# Patient Record
Sex: FEMALE | Race: WHITE | NOT HISPANIC OR LATINO
[De-identification: names, ages, dates, MRNs, and addresses within clinical notes are randomized per-mention and may not be internally consistent; named-entity substitution may affect disease eponyms.]

---

## 2023-03-30 PROBLEM — Z00.00 ENCOUNTER FOR PREVENTIVE HEALTH EXAMINATION: Status: ACTIVE | Noted: 2023-03-30

## 2023-04-11 ENCOUNTER — APPOINTMENT (OUTPATIENT)
Dept: ORTHOPEDIC SURGERY | Facility: CLINIC | Age: 83
End: 2023-04-11
Payer: MEDICARE

## 2023-04-11 VITALS — HEIGHT: 60 IN | WEIGHT: 197 LBS | BODY MASS INDEX: 38.68 KG/M2

## 2023-04-11 PROCEDURE — 99203 OFFICE O/P NEW LOW 30 MIN: CPT

## 2023-04-11 PROCEDURE — 72100 X-RAY EXAM L-S SPINE 2/3 VWS: CPT

## 2023-04-11 PROCEDURE — 73560 X-RAY EXAM OF KNEE 1 OR 2: CPT | Mod: 50

## 2023-04-11 NOTE — ASSESSMENT
[FreeTextEntry1] :  right now emphasis on weight management without any effort at weight control on concern is not much we can offer long-term will going to get gel injection for the knees will try some physical therapy could ultimately use pain management for the spine off for geniculate nerve blocks I will see her a couple months

## 2023-04-11 NOTE — IMAGING
[de-identified] :  markedly overweight\par  spasm in the back no focal neurologic deficits \par \par Hip motion good \par  both knees swelling crepitus limits in flexion does have a cane\par \par Calf soft negative Homans sign \par \par Lumbar x-rays marked arthritic change some scoliosis evidence of arterial stents\par \par X-rays both knees moderate arthritic change

## 2023-05-16 ENCOUNTER — APPOINTMENT (OUTPATIENT)
Dept: ORTHOPEDIC SURGERY | Facility: CLINIC | Age: 83
End: 2023-05-16
Payer: MEDICARE

## 2023-05-16 PROCEDURE — 99213 OFFICE O/P EST LOW 20 MIN: CPT | Mod: 25

## 2023-05-16 PROCEDURE — 20610 DRAIN/INJ JOINT/BURSA W/O US: CPT | Mod: 50

## 2023-05-16 NOTE — IMAGING
[de-identified] :  markedly overweight\par  spasm in the back no focal neurologic deficits \par \par Hip motion good \par  both knees swelling crepitus limits in flexion does have a cane\par \par Calf soft negative Homans sign \par \par Lumbar x-rays marked arthritic change some scoliosis evidence of arterial stents\par \par X-rays both knees moderate arthritic change

## 2023-05-16 NOTE — ASSESSMENT
[FreeTextEntry1] :  right now emphasis on weight management without any effort at weight control on concern is not much we can offer long-term will going to do gel injection for the knees toady the I  discussed with patient today the  option of a  Visco supplement injection to the knee.  risks and benefits were  reviewed a  consent was  given,  with sterile technique through a lateral approach the gel injections were  delivered and  tolerated well. a Band-Aids were applied\par  will try some physical therapy could ultimately use pain management for the spine  for geniculate nerve blocks I will see her 3 months

## 2023-05-16 NOTE — REASON FOR VISIT
[Spouse] : spouse [Family Member] : family member [FreeTextEntry2] : bilateral knee synvisc one  The started therapy 3 visits still back pain using a cane

## 2023-05-16 NOTE — HISTORY OF PRESENT ILLNESS
[de-identified] : [age] years of age here with family for her low back and knees several years of problems predating pain and de make reports having shots at sometime swelling in the knees swelling in the ankles had some diagnostics put not recent pain is 10/10 sitting makes it worse no therapy allergy to penicillin does not smoke medication for blood pressure gout and vitamins

## 2023-08-24 ENCOUNTER — APPOINTMENT (OUTPATIENT)
Dept: ORTHOPEDIC SURGERY | Facility: CLINIC | Age: 83
End: 2023-08-24
Payer: MEDICARE

## 2023-08-24 PROCEDURE — 99213 OFFICE O/P EST LOW 20 MIN: CPT

## 2023-08-24 NOTE — HISTORY OF PRESENT ILLNESS
[de-identified] : [age] years of age here with family for her low back and knees several years of problems predating pain and de make reports having shots at sometime swelling in the knees swelling in the ankles had some diagnostics put not recent pain is 10/10 sitting makes it worse no therapy allergy to penicillin does not smoke medication for blood pressure gout and vitamins

## 2023-08-24 NOTE — REASON FOR VISIT
[Spouse] : spouse [FreeTextEntry2] : lower back and bilat knee pain gel to both knees was helpful she did a few visits to therapy then got sick really was not thrilled with the place did lose 4 pounds pain is still traveling into both legs from the back using the cane

## 2023-08-24 NOTE — IMAGING
[de-identified] :  markedly overweight\par   spasm in the back no focal neurologic deficits \par  \par  Hip motion good \par   both knees swelling crepitus limits in flexion does have a cane\par  \par  Calf soft negative Homans sign \par  \par  Lumbar x-rays marked arthritic change some scoliosis evidence of arterial stents\par  \par  X-rays both knees moderate arthritic change

## 2023-08-24 NOTE — ASSESSMENT
[FreeTextEntry1] :  right now emphasis on weight management without any effort at weight control my concern is not much we can offer long-term will did gel injection for the knees which helped she is agreeable to try a different physical therapy location and continue to work with her weight reduction I will see her back in November we might consider repeating the gel injections to the knees at that time I will see her 3 months

## 2023-11-02 ENCOUNTER — APPOINTMENT (OUTPATIENT)
Dept: ORTHOPEDIC SURGERY | Facility: CLINIC | Age: 83
End: 2023-11-02
Payer: MEDICARE

## 2023-11-02 PROCEDURE — 99213 OFFICE O/P EST LOW 20 MIN: CPT

## 2023-12-11 ENCOUNTER — APPOINTMENT (OUTPATIENT)
Dept: ORTHOPEDIC SURGERY | Facility: CLINIC | Age: 83
End: 2023-12-11
Payer: MEDICARE

## 2023-12-11 PROCEDURE — 20610 DRAIN/INJ JOINT/BURSA W/O US: CPT | Mod: 50

## 2023-12-11 PROCEDURE — 99213 OFFICE O/P EST LOW 20 MIN: CPT | Mod: 25

## 2024-03-11 ENCOUNTER — APPOINTMENT (OUTPATIENT)
Dept: ORTHOPEDIC SURGERY | Facility: CLINIC | Age: 84
End: 2024-03-11
Payer: MEDICARE

## 2024-03-11 PROCEDURE — 99213 OFFICE O/P EST LOW 20 MIN: CPT

## 2024-03-11 NOTE — IMAGING
[de-identified] :  markedly overweight\par   spasm in the back no focal neurologic deficits \par  \par  Hip motion good \par   both knees swelling crepitus limits in flexion does have a cane\par  \par  Calf soft negative Homans sign \par  \par  Lumbar x-rays marked arthritic change some scoliosis evidence of arterial stents\par  \par  X-rays both knees moderate arthritic change

## 2024-03-11 NOTE — REASON FOR VISIT
[Spouse] : spouse [FreeTextEntry2] : Patient is coming in for a follow up on bilateral knees.  Still using a cane gel helped a little bit no help with the back can only stand 20 minutes lost only 3 pounds does have a lot of difficulty with bladder control

## 2024-03-11 NOTE — ASSESSMENT
[FreeTextEntry1] : Gel was helpful certainly could repeated but I am fearful that without any substantive weight reduction the back pain is only going to get worse she is seeing her internist I gave her a note to inquire whether or not she could try Ozempic return in 3 months repeat the gel at that time

## 2024-03-11 NOTE — HISTORY OF PRESENT ILLNESS
[de-identified] : [age] years of age here with family for her low back and knees several years of problems predating pain and de make reports having shots at sometime swelling in the knees swelling in the ankles had some diagnostics put not recent pain is 10/10 sitting makes it worse no therapy allergy to penicillin does not smoke medication for blood pressure gout and vitamins

## 2024-06-11 ENCOUNTER — APPOINTMENT (OUTPATIENT)
Dept: ORTHOPEDIC SURGERY | Facility: CLINIC | Age: 84
End: 2024-06-11

## 2024-06-11 DIAGNOSIS — M17.11 UNILATERAL PRIMARY OSTEOARTHRITIS, RIGHT KNEE: ICD-10-CM

## 2024-06-11 DIAGNOSIS — M51.9 UNSPECIFIED THORACIC, THORACOLUMBAR AND LUMBOSACRAL INTERVERTEBRAL DISC DISORDER: ICD-10-CM

## 2024-06-11 DIAGNOSIS — M17.12 UNILATERAL PRIMARY OSTEOARTHRITIS, LEFT KNEE: ICD-10-CM

## 2024-06-11 PROCEDURE — ZZZZZ: CPT

## 2024-06-11 NOTE — REASON FOR VISIT
[Spouse] : spouse [FreeTextEntry2] : Patient is coming in for a follow up on bilateral knee injections synvisc one was on Ozempic for few weeks and had a reaction did lose 10 pounds stopped it right now

## 2024-06-11 NOTE — IMAGING
[de-identified] :  markedly overweight\par   spasm in the back no focal neurologic deficits \par  \par  Hip motion good \par   both knees swelling crepitus limits in flexion does have a cane\par  \par  Calf soft negative Homans sign \par  \par  Lumbar x-rays marked arthritic change some scoliosis evidence of arterial stents\par  \par  X-rays both knees moderate arthritic change

## 2024-06-11 NOTE — HISTORY OF PRESENT ILLNESS
[de-identified] : [age] years of age here with family for her low back and knees several years of problems predating pain and de make reports having shots at sometime swelling in the knees swelling in the ankles had some diagnostics put not recent pain is 10/10 sitting makes it worse no therapy allergy to penicillin does not smoke medication for blood pressure gout and vitamins
bleeding/pregnancy problem

## 2024-06-11 NOTE — ASSESSMENT
[FreeTextEntry1] : Gel was helpful certainly could repeated but I am fearful that without any substantive weight reduction the back pain is only going to get worse  I  discussed with patient today the  option of a  Visco supplement injection to both knees.  risks and benefits were  reviewed a  consent was  given,  with sterile technique through a lateral approach the gel injection was delivered and  tolerated well. a Band-Aid was applied

## 2024-07-23 ENCOUNTER — INPATIENT (INPATIENT)
Facility: HOSPITAL | Age: 84
LOS: 5 days | Discharge: HOME HEALTH | DRG: 65 | End: 2024-07-29
Attending: STUDENT IN AN ORGANIZED HEALTH CARE EDUCATION/TRAINING PROGRAM | Admitting: STUDENT IN AN ORGANIZED HEALTH CARE EDUCATION/TRAINING PROGRAM
Payer: MEDICARE

## 2024-07-23 VITALS
TEMPERATURE: 99 F | WEIGHT: 190.04 LBS | OXYGEN SATURATION: 95 % | SYSTOLIC BLOOD PRESSURE: 153 MMHG | DIASTOLIC BLOOD PRESSURE: 79 MMHG | RESPIRATION RATE: 18 BRPM | HEIGHT: 64 IN | HEART RATE: 64 BPM

## 2024-07-23 DIAGNOSIS — I63.9 CEREBRAL INFARCTION, UNSPECIFIED: ICD-10-CM

## 2024-07-23 LAB
ALBUMIN SERPL ELPH-MCNC: 3.9 G/DL — SIGNIFICANT CHANGE UP (ref 3.5–5.2)
ALP SERPL-CCNC: 96 U/L — SIGNIFICANT CHANGE UP (ref 30–115)
ALT FLD-CCNC: 13 U/L — SIGNIFICANT CHANGE UP (ref 0–41)
ANION GAP SERPL CALC-SCNC: 14 MMOL/L — SIGNIFICANT CHANGE UP (ref 7–14)
AST SERPL-CCNC: 35 U/L — SIGNIFICANT CHANGE UP (ref 0–41)
BASOPHILS # BLD AUTO: 0.03 K/UL — SIGNIFICANT CHANGE UP (ref 0–0.2)
BASOPHILS NFR BLD AUTO: 0.4 % — SIGNIFICANT CHANGE UP (ref 0–1)
BILIRUB SERPL-MCNC: 1 MG/DL — SIGNIFICANT CHANGE UP (ref 0.2–1.2)
BUN SERPL-MCNC: 15 MG/DL — SIGNIFICANT CHANGE UP (ref 10–20)
CALCIUM SERPL-MCNC: 10.7 MG/DL — HIGH (ref 8.4–10.5)
CHLORIDE SERPL-SCNC: 110 MMOL/L — SIGNIFICANT CHANGE UP (ref 98–110)
CO2 SERPL-SCNC: 17 MMOL/L — SIGNIFICANT CHANGE UP (ref 17–32)
CREAT SERPL-MCNC: 0.8 MG/DL — SIGNIFICANT CHANGE UP (ref 0.7–1.5)
EGFR: 73 ML/MIN/1.73M2 — SIGNIFICANT CHANGE UP
EOSINOPHIL # BLD AUTO: 0.07 K/UL — SIGNIFICANT CHANGE UP (ref 0–0.7)
EOSINOPHIL NFR BLD AUTO: 1 % — SIGNIFICANT CHANGE UP (ref 0–8)
GLUCOSE SERPL-MCNC: 144 MG/DL — HIGH (ref 70–99)
HCT VFR BLD CALC: 38.2 % — SIGNIFICANT CHANGE UP (ref 37–47)
HGB BLD-MCNC: 13 G/DL — SIGNIFICANT CHANGE UP (ref 12–16)
IMM GRANULOCYTES NFR BLD AUTO: 0.7 % — HIGH (ref 0.1–0.3)
LYMPHOCYTES # BLD AUTO: 1.62 K/UL — SIGNIFICANT CHANGE UP (ref 1.2–3.4)
LYMPHOCYTES # BLD AUTO: 22.9 % — SIGNIFICANT CHANGE UP (ref 20.5–51.1)
MAGNESIUM SERPL-MCNC: 1.6 MG/DL — LOW (ref 1.8–2.4)
MCHC RBC-ENTMCNC: 32.2 PG — HIGH (ref 27–31)
MCHC RBC-ENTMCNC: 34 G/DL — SIGNIFICANT CHANGE UP (ref 32–37)
MCV RBC AUTO: 94.6 FL — SIGNIFICANT CHANGE UP (ref 81–99)
MONOCYTES # BLD AUTO: 0.54 K/UL — SIGNIFICANT CHANGE UP (ref 0.1–0.6)
MONOCYTES NFR BLD AUTO: 7.6 % — SIGNIFICANT CHANGE UP (ref 1.7–9.3)
NEUTROPHILS # BLD AUTO: 4.76 K/UL — SIGNIFICANT CHANGE UP (ref 1.4–6.5)
NEUTROPHILS NFR BLD AUTO: 67.4 % — SIGNIFICANT CHANGE UP (ref 42.2–75.2)
NRBC # BLD: 0 /100 WBCS — SIGNIFICANT CHANGE UP (ref 0–0)
PLATELET # BLD AUTO: 201 K/UL — SIGNIFICANT CHANGE UP (ref 130–400)
PMV BLD: 9.8 FL — SIGNIFICANT CHANGE UP (ref 7.4–10.4)
POTASSIUM SERPL-MCNC: 4.3 MMOL/L — SIGNIFICANT CHANGE UP (ref 3.5–5)
POTASSIUM SERPL-SCNC: 4.3 MMOL/L — SIGNIFICANT CHANGE UP (ref 3.5–5)
PROT SERPL-MCNC: 6.2 G/DL — SIGNIFICANT CHANGE UP (ref 6–8)
RBC # BLD: 4.04 M/UL — LOW (ref 4.2–5.4)
RBC # FLD: 13.1 % — SIGNIFICANT CHANGE UP (ref 11.5–14.5)
SODIUM SERPL-SCNC: 141 MMOL/L — SIGNIFICANT CHANGE UP (ref 135–146)
WBC # BLD: 7.07 K/UL — SIGNIFICANT CHANGE UP (ref 4.8–10.8)
WBC # FLD AUTO: 7.07 K/UL — SIGNIFICANT CHANGE UP (ref 4.8–10.8)

## 2024-07-23 PROCEDURE — 97110 THERAPEUTIC EXERCISES: CPT | Mod: GP

## 2024-07-23 PROCEDURE — 97535 SELF CARE MNGMENT TRAINING: CPT | Mod: GO

## 2024-07-23 PROCEDURE — 93010 ELECTROCARDIOGRAM REPORT: CPT

## 2024-07-23 PROCEDURE — 80076 HEPATIC FUNCTION PANEL: CPT

## 2024-07-23 PROCEDURE — 83036 HEMOGLOBIN GLYCOSYLATED A1C: CPT

## 2024-07-23 PROCEDURE — 85025 COMPLETE CBC W/AUTO DIFF WBC: CPT

## 2024-07-23 PROCEDURE — 80053 COMPREHEN METABOLIC PANEL: CPT

## 2024-07-23 PROCEDURE — 36415 COLL VENOUS BLD VENIPUNCTURE: CPT

## 2024-07-23 PROCEDURE — 86140 C-REACTIVE PROTEIN: CPT

## 2024-07-23 PROCEDURE — 97166 OT EVAL MOD COMPLEX 45 MIN: CPT | Mod: GO

## 2024-07-23 PROCEDURE — 93005 ELECTROCARDIOGRAM TRACING: CPT

## 2024-07-23 PROCEDURE — 97530 THERAPEUTIC ACTIVITIES: CPT | Mod: GO

## 2024-07-23 PROCEDURE — 80061 LIPID PANEL: CPT

## 2024-07-23 PROCEDURE — 99285 EMERGENCY DEPT VISIT HI MDM: CPT

## 2024-07-23 PROCEDURE — 93306 TTE W/DOPPLER COMPLETE: CPT

## 2024-07-23 PROCEDURE — 92507 TX SP LANG VOICE COMM INDIV: CPT | Mod: GN

## 2024-07-23 PROCEDURE — 80048 BASIC METABOLIC PNL TOTAL CA: CPT

## 2024-07-23 PROCEDURE — 71045 X-RAY EXAM CHEST 1 VIEW: CPT

## 2024-07-23 PROCEDURE — 99222 1ST HOSP IP/OBS MODERATE 55: CPT

## 2024-07-23 PROCEDURE — 84443 ASSAY THYROID STIM HORMONE: CPT

## 2024-07-23 PROCEDURE — 92610 EVALUATE SWALLOWING FUNCTION: CPT | Mod: GN

## 2024-07-23 PROCEDURE — 82962 GLUCOSE BLOOD TEST: CPT

## 2024-07-23 PROCEDURE — 70498 CT ANGIOGRAPHY NECK: CPT | Mod: 26,MC

## 2024-07-23 PROCEDURE — 97116 GAIT TRAINING THERAPY: CPT | Mod: GP

## 2024-07-23 PROCEDURE — 92523 SPEECH SOUND LANG COMPREHEN: CPT | Mod: GN

## 2024-07-23 PROCEDURE — 97162 PT EVAL MOD COMPLEX 30 MIN: CPT | Mod: GP

## 2024-07-23 PROCEDURE — 70496 CT ANGIOGRAPHY HEAD: CPT | Mod: 26,MC

## 2024-07-23 PROCEDURE — 85652 RBC SED RATE AUTOMATED: CPT

## 2024-07-23 PROCEDURE — 83735 ASSAY OF MAGNESIUM: CPT

## 2024-07-23 PROCEDURE — 84100 ASSAY OF PHOSPHORUS: CPT

## 2024-07-23 RX ORDER — PANTOPRAZOLE SODIUM 20 MG/1
40 TABLET, DELAYED RELEASE ORAL
Refills: 0 | Status: DISCONTINUED | OUTPATIENT
Start: 2024-07-23 | End: 2024-07-29

## 2024-07-23 RX ORDER — PANTOPRAZOLE SODIUM 20 MG/1
1 TABLET, DELAYED RELEASE ORAL
Refills: 0 | DISCHARGE

## 2024-07-23 RX ORDER — ENOXAPARIN SODIUM 120 MG/.8ML
40 INJECTION SUBCUTANEOUS EVERY 24 HOURS
Refills: 0 | Status: DISCONTINUED | OUTPATIENT
Start: 2024-07-23 | End: 2024-07-29

## 2024-07-23 RX ORDER — METOPROLOL TARTRATE 100 MG
1 TABLET ORAL
Refills: 0 | DISCHARGE

## 2024-07-23 RX ORDER — CLOPIDOGREL BISULFATE 75 MG/1
75 TABLET, FILM COATED ORAL DAILY
Refills: 0 | Status: DISCONTINUED | OUTPATIENT
Start: 2024-07-24 | End: 2024-07-29

## 2024-07-23 RX ORDER — ASPIRIN 500 MG
81 TABLET ORAL DAILY
Refills: 0 | Status: DISCONTINUED | OUTPATIENT
Start: 2024-07-23 | End: 2024-07-29

## 2024-07-23 RX ORDER — BACTERIOSTATIC SODIUM CHLORIDE 0.9 %
1000 VIAL (ML) INJECTION ONCE
Refills: 0 | Status: COMPLETED | OUTPATIENT
Start: 2024-07-23 | End: 2024-07-23

## 2024-07-23 RX ORDER — OXYBUTYNIN CHLORIDE 5 MG/1
5 TABLET, FILM COATED, EXTENDED RELEASE ORAL
Refills: 0 | Status: DISCONTINUED | OUTPATIENT
Start: 2024-07-23 | End: 2024-07-29

## 2024-07-23 RX ORDER — LOSARTAN POTASSIUM 50 MG/1
100 TABLET, FILM COATED ORAL DAILY
Refills: 0 | Status: DISCONTINUED | OUTPATIENT
Start: 2024-07-23 | End: 2024-07-29

## 2024-07-23 RX ORDER — TOLTERODINE TARTRATE 2 MG/1
1 CAPSULE, EXTENDED RELEASE ORAL
Refills: 0 | DISCHARGE

## 2024-07-23 RX ORDER — ASPIRIN 500 MG
1 TABLET ORAL
Refills: 0 | DISCHARGE

## 2024-07-23 RX ORDER — CLOPIDOGREL BISULFATE 75 MG/1
TABLET, FILM COATED ORAL
Refills: 0 | Status: DISCONTINUED | OUTPATIENT
Start: 2024-07-23 | End: 2024-07-29

## 2024-07-23 RX ORDER — METOPROLOL TARTRATE 100 MG
100 TABLET ORAL
Refills: 0 | Status: DISCONTINUED | OUTPATIENT
Start: 2024-07-23 | End: 2024-07-29

## 2024-07-23 RX ORDER — MAGNESIUM SULFATE 500 MG/ML
2 VIAL (ML) INJECTION ONCE
Refills: 0 | Status: COMPLETED | OUTPATIENT
Start: 2024-07-23 | End: 2024-07-23

## 2024-07-23 RX ORDER — ATORVASTATIN CALCIUM 40 MG/1
80 TABLET, FILM COATED ORAL AT BEDTIME
Refills: 0 | Status: DISCONTINUED | OUTPATIENT
Start: 2024-07-23 | End: 2024-07-29

## 2024-07-23 RX ORDER — MIRABEGRON 50 MG/1
1 TABLET, FILM COATED, EXTENDED RELEASE ORAL
Refills: 0 | DISCHARGE

## 2024-07-23 RX ORDER — IRBESARTAN 150 MG/1
1 TABLET ORAL
Refills: 0 | DISCHARGE

## 2024-07-23 RX ORDER — CLOPIDOGREL BISULFATE 75 MG/1
300 TABLET, FILM COATED ORAL ONCE
Refills: 0 | Status: COMPLETED | OUTPATIENT
Start: 2024-07-23 | End: 2024-07-23

## 2024-07-23 RX ADMIN — Medication 25 GRAM(S): at 23:53

## 2024-07-23 RX ADMIN — Medication 81 MILLIGRAM(S): at 23:52

## 2024-07-23 RX ADMIN — CLOPIDOGREL BISULFATE 300 MILLIGRAM(S): 75 TABLET, FILM COATED ORAL at 23:51

## 2024-07-23 RX ADMIN — Medication 83.33 MILLILITER(S): at 23:53

## 2024-07-23 NOTE — ED PROVIDER NOTE - ATTENDING APP SHARED VISIT CONTRIBUTION OF CARE
82-year-old presented today for evaluation of stroke.  Patient had MRI performed which is indicative of stroke.  Patient is pending neurological evaluation for further disposition .

## 2024-07-23 NOTE — H&P ADULT - HISTORY OF PRESENT ILLNESS
82-year-old right handed, no substance use, living with , retired; with PMHx of HTN, HLD, urinary incontinence, GERD; presented today for evaluation of stroke.  Patient had MRI performed which is indicative of stroke. Patient presented 4 days ago in the morning with dysarthria and difficulty swallow that wouldn't resolve. Initially she thought it was a cold, but finally today she visited her PCP Dr. Dominguez, who ordered the workup and referred her to ED for evaluation. Patient will be admitted to neurology for further workup and management    In ED:    T(C): 37.2 (23 Jul 2024 19:41), Max: 37.2 (23 Jul 2024 19:41)  T(F): 98.9 (23 Jul 2024 19:41), Max: 98.9 (23 Jul 2024 19:41)  HR: 64 (23 Jul 2024 19:41) (64 - 64)  BP: 153/79 (23 Jul 2024 19:41) (153/79 - 153/79)  RR: 18 (23 Jul 2024 19:41) (18 - 18)  SpO2: 95% (23 Jul 2024 19:41) (95% - 95%)  Patient On (Oxygen Delivery Method): room air    Labs: HCO3 17 . Ca 10.7. Mg 1.6    CT HEAD: No evidence of acute intracranial hemorrhage, mass, or midline shift. Chronic microvascular changes.    CTA: Negative CTA of the head and neck. No evidence of major vascular stenosis, occlusion, or aneurysm.

## 2024-07-23 NOTE — H&P ADULT - NSHPPHYSICALEXAM_GEN_ALL_CORE
GEN: NAD, pleasant, cooperative  CVS: RRR, no carotid bruit  CHEST: No signs of  distress, on room air  ABD: Soft, NTTP  NEURO:     MENTAL STATUS: AAOx3    LANG/SPEECH: Fluent, intact naming, repetition & comprehension. Mild-moderate dysarthria    CRANIAL NERVES:     - II: Pupils equal and reactive, no RAPD, normal visual field      - III, IV, VI: EOM intact, no gaze preference or deviation     - V: normal     - VII: no facial asymmetry     - VIII: normal hearing to speech    MOTOR: 5/5 in both upper and lower extremities    REFLEXES: 2/4 throughout, bilateral flexor planters    SENSORY: Normal to touch, temperature & pin prick in all extremiteis    COORD: Normal finger to nose and heel to shin, no tremor, no dysmetria    NIHSS = 1

## 2024-07-23 NOTE — H&P ADULT - NSHPLABSRESULTS_GEN_ALL_CORE
LABS:                         13.0   7.07  )-----------( 201      ( 23 Jul 2024 20:58 )             38.2     07-23    141  |  110  |  15  ----------------------------<  144<H>  4.3   |  17  |  0.8    Ca    10.7<H>      23 Jul 2024 20:58  Mg     1.6     07-23    TPro  6.2  /  Alb  3.9  /  TBili  1.0  /  DBili  x   /  AST  35  /  ALT  13  /  AlkPhos  96  07-23      Urinalysis Basic - ( 23 Jul 2024 20:58 )    Color: x / Appearance: x / SG: x / pH: x  Gluc: 144 mg/dL / Ketone: x  / Bili: x / Urobili: x   Blood: x / Protein: x / Nitrite: x   Leuk Esterase: x / RBC: x / WBC x   Sq Epi: x / Non Sq Epi: x / Bacteria: x                RADIOLOGY, EKG & ADDITIONAL TESTS:

## 2024-07-23 NOTE — H&P ADULT - ATTENDING COMMENTS
Pt is a 84 yo F with PMhx of HTN, HLD, GERD, who presented with stroke on MRI brain from PCP. MRI was obtained for 1 week h/o speech difficulty and mild dysphagia. pt also notes mild right hemiparesis, RLE>RUE. NIHSS 2.     Impr: acute/subacute ischemic stroke in left BG  CTA head/neck without significant flow limiting stenosis  [id number] with extensive chronic ischemic white matter disease  Etiology:  vs ESUS  PTA: ASA_> DAPT x 21 days then ASA  High intensity statin  TTE, tele monitoring. Discussed ILR placement, pt/family are considering it  PT/OT/ST, dispo planning

## 2024-07-23 NOTE — ED ADULT TRIAGE NOTE - CHIEF COMPLAINT QUOTE
pt BIBA for further eval of acute stroke. pt began experiencing symptoms 4 days ago of decreased  and slurred speech, MD saw stroke today on CT

## 2024-07-23 NOTE — ED PROVIDER NOTE - CLINICAL SUMMARY MEDICAL DECISION MAKING FREE TEXT BOX
Labs, EKG and imaging were ordered, where indicated.  Independent interpretation of any labs, EKG & imaging that was ordered was performed by me, Dr. Mayen. Appropriate medications for patient's presenting complaints were ordered and effects were reassessed, where indicated.  Patient's records (prior hospital, ED visit, and/or nursing home note) were reviewed, if available.  Additional history was obtained from EMS, family, and/or PCP (where available).  Escalation to admission/observation was considered.  Patient requires inpatient hospitalization - monitored setting.     Authored by Dr. Alana Mayen: s/o to me by Dr. Beltran - 80f p/w stroke-sx x 4d, +cva seen on outpt imaging, referred to ED for admission, labs/imaging in ED as resulted, s/o to me pending neuro consult who rec admission to their [facility] for further mgmt

## 2024-07-23 NOTE — H&P ADULT - ASSESSMENT
82-year-old right handed, no substance use, living with , retired; with PMHx of HTN, HLD, urinary incontinence, GERD; presented today for evaluation of stroke.  Patient had MRI performed which is indicative of stroke. Patient presented 4 days ago in the morning with dysarthria and difficulty swallow that wouldn't resolve. Initially she thought it was a cold, but finally today she visited her PCP Dr. Dominguez, who ordered the workup and referred her to ED for evaluation. Patient will be admitted to neurology for further workup and management    Neuro  #Cerebral infartion in left corona radiata of undetermined etiology (pending workup)  - start plavix 300 mg daily  - continue aspirin 81mg and plavix 75mg daily  - continue atorvastatin 80mg daily  - q4hr stroke neuro checks and vitals  - Goal -160  - MRI Brain without contrast: ischemic stroke in left corona radiata  - CT HEAD: No evidence of acute intracranial hemorrhage, mass, or midline shift. Chronic microvascular changes.  - CTA: Negative CTA of the head and neck. No evidence of major vascular stenosis, occlusion, or aneurysm.  - Stroke education    Cards  #HTN  - permissive hypertension, Goal -180  - continue home blood pressure medication metoprolol and irbersartan  - obtain TTE with bubble  - Stroke Code EKG Results: Pending    #HLD  Hold home med pravastatin  - high dose statin as above in CVA  - LDL results: Pending    Pulm  - call provider if SPO2 < 94%    GI  #Nutrition/Fluids/Electrolytes   - replete K<4 and Mg <2  - Diet: NPO except meds  - IVF: NS 70 ml/h    Renal  - Cr normal  - Mg 1.6 (repleated)    #Urinary incontinence  On home med Myrbetriq and tolterodine  Hold tolterodine for now  - Continue Myrbetriq 50 mg QD  - Start oxybutynin 5 mg BID    Infectious Disease  - Stroke Code CXR results: Penidng    Endocrine  #DM  - A1C results: Pending  - ISS    - TSH results: Pending    DVT Prophylaxis  - SCDs for DVT prophylaxis   - GI prophylaxis for GERD: Pantoprazole 40 mg QD      IDR Goals: Goals reviewed at interdisciplinary rounds with case management, social work, physical therapy, occupational therapy, and speech language pathology.   Please see specific therapy  notes for in depth goals.  Dispo: Telemetry     Discussed daily hospital plans and goals with patient and family at bedside. (Called and updated family.)    Discussed with Neurology Attending Dr. Greenberg 82-year-old right handed, no substance use, living with , retired; with PMHx of HTN, HLD, urinary incontinence, GERD; presented today for evaluation of stroke.  Patient had MRI performed which is indicative of stroke. Patient presented 4 days ago in the morning with dysarthria and difficulty swallow that wouldn't resolve. Initially she thought it was a cold, but finally today she visited her PCP Dr. Dominguez, who ordered the workup and referred her to ED for evaluation. Patient will be admitted to neurology for further workup and management    Neuro  #Cerebral infarction in left corona radiata of undetermined etiology (pending workup)  - start plavix 300 mg daily  - continue aspirin 81mg and plavix 75mg daily  - continue atorvastatin 80mg daily  - q4hr stroke neuro checks and vitals  - Goal -160  - MRI Brain without contrast: ischemic stroke in left corona radiata  - CT HEAD: No evidence of acute intracranial hemorrhage, mass, or midline shift. Chronic microvascular changes.  - CTA: Negative CTA of the head and neck. No evidence of major vascular stenosis, occlusion, or aneurysm.  - Stroke education    Cards  #HTN  - permissive hypertension, Goal -180  - continue home blood pressure medication metoprolol and irbersartan  - obtain TTE with bubble  - Stroke Code EKG Results: Pending    #HLD  Hold home med pravastatin  - high dose statin as above in CVA  - LDL results: Pending    Pulm  - call provider if SPO2 < 94%    GI  #Nutrition/Fluids/Electrolytes   - replete K<4 and Mg <2  - Diet: NPO except meds  - IVF: NS 70 ml/h    Renal  - Cr normal  - Mg 1.6 (repleted)    #Urinary incontinence  On home med Myrbetriq and tolterodine  Hold tolterodine for now  - Continue Myrbetriq 50 mg QD  - Start oxybutynin 5 mg BID    Infectious Disease  - Stroke Code CXR results: Pending    Endocrine  #DM  - A1C results: Pending  - ISS    - TSH results: Pending    DVT Prophylaxis  - SCDs for DVT prophylaxis   - GI prophylaxis for GERD: Pantoprazole 40 mg QD      IDR Goals: Goals reviewed at interdisciplinary rounds with case management, social work, physical therapy, occupational therapy, and speech language pathology.   Please see specific therapy  notes for in depth goals.  Dispo: Telemetry     Discussed daily hospital plans and goals with patient and family at bedside. (Called and updated family.)    Discussed with Neurology Attending Dr. Greenberg

## 2024-07-23 NOTE — ED PROVIDER NOTE - OBJECTIVE STATEMENT
83 year old F with hx of htn, hld c/o 4 days of slurred speech and rt sided weakness. Pt had ct head yesterday ordered by pmd was told was negative. Pt had MRI today and was told showed a stroke so instructed to come to ed. No dizziness, headache, ams, chest pain, sob, falls/injuries, inability to bare weight or ambulate, nausea, vomiting.

## 2024-07-23 NOTE — H&P ADULT - NSICDXPASTMEDICALHX_GEN_ALL_CORE_FT
PAST MEDICAL HISTORY:  GERD (gastroesophageal reflux disease)     HTN (hypertension)     Hyperlipemia     Urinary incontinence

## 2024-07-23 NOTE — ED PROVIDER NOTE - PHYSICAL EXAMINATION
CONSTITUTIONAL: Well-appearing; well-nourished; in no apparent distress.   EYES: PERRL; EOM intact.   ENT: normal nose; no rhinorrhea; normal pharynx with no tonsillar hypertrophy.   NECK: Supple; non-tender; no cervical lymphadenopathy. No JVD.   CARDIOVASCULAR: Normal S1, S2; no murmurs, rubs, or gallops.   RESPIRATORY: Normal chest excursion with respiration; breath sounds clear and equal bilaterally; no wheezes, rhonchi, or rales.  GI/: Normal bowel sounds; non-distended; non-tender; no palpable organomegaly.   MS: No evidence of trauma or deformity. Non-tender to palpation. No scoliosis. No CVA tenderness. Normal ROM in all four extremities; non-tender to palpation; distal pulses are normal.   SKIN: Normal for age and race; warm; dry; good turgor; no apparent lesions or exudate.   NEURO/PSYCH: A & O x 4; + rt sided facial droop. + dysarthria. + 4/5 strength rue. + 3/5 strength rle. No tongue deviation. Cerebellar intact. Sensation intact

## 2024-07-24 ENCOUNTER — RESULT REVIEW (OUTPATIENT)
Age: 84
End: 2024-07-24

## 2024-07-24 LAB
A1C WITH ESTIMATED AVERAGE GLUCOSE RESULT: 6 % — HIGH (ref 4–5.6)
ALBUMIN SERPL ELPH-MCNC: 3.9 G/DL — SIGNIFICANT CHANGE UP (ref 3.5–5.2)
ALP SERPL-CCNC: 92 U/L — SIGNIFICANT CHANGE UP (ref 30–115)
ALT FLD-CCNC: 16 U/L — SIGNIFICANT CHANGE UP (ref 0–41)
ANION GAP SERPL CALC-SCNC: 17 MMOL/L — HIGH (ref 7–14)
AST SERPL-CCNC: 23 U/L — SIGNIFICANT CHANGE UP (ref 0–41)
BILIRUB DIRECT SERPL-MCNC: 0.3 MG/DL — SIGNIFICANT CHANGE UP (ref 0–0.3)
BILIRUB INDIRECT FLD-MCNC: 0.9 MG/DL — SIGNIFICANT CHANGE UP (ref 0.2–1.2)
BILIRUB SERPL-MCNC: 1.2 MG/DL — SIGNIFICANT CHANGE UP (ref 0.2–1.2)
BUN SERPL-MCNC: 12 MG/DL — SIGNIFICANT CHANGE UP (ref 10–20)
CALCIUM SERPL-MCNC: 10.6 MG/DL — HIGH (ref 8.4–10.4)
CHLORIDE SERPL-SCNC: 107 MMOL/L — SIGNIFICANT CHANGE UP (ref 98–110)
CHOLEST SERPL-MCNC: 163 MG/DL — SIGNIFICANT CHANGE UP
CO2 SERPL-SCNC: 20 MMOL/L — SIGNIFICANT CHANGE UP (ref 17–32)
CREAT SERPL-MCNC: 0.8 MG/DL — SIGNIFICANT CHANGE UP (ref 0.7–1.5)
CRP SERPL-MCNC: 11.4 MG/L — HIGH
EGFR: 73 ML/MIN/1.73M2 — SIGNIFICANT CHANGE UP
ERYTHROCYTE [SEDIMENTATION RATE] IN BLOOD: 16 MM/HR — SIGNIFICANT CHANGE UP (ref 0–20)
ESTIMATED AVERAGE GLUCOSE: 126 MG/DL — HIGH (ref 68–114)
GLUCOSE SERPL-MCNC: 101 MG/DL — HIGH (ref 70–99)
HDLC SERPL-MCNC: 57 MG/DL — SIGNIFICANT CHANGE UP
LIPID PNL WITH DIRECT LDL SERPL: 84 MG/DL — SIGNIFICANT CHANGE UP
NON HDL CHOLESTEROL: 106 MG/DL — SIGNIFICANT CHANGE UP
POTASSIUM SERPL-MCNC: 3.9 MMOL/L — SIGNIFICANT CHANGE UP (ref 3.5–5)
POTASSIUM SERPL-SCNC: 3.9 MMOL/L — SIGNIFICANT CHANGE UP (ref 3.5–5)
PROT SERPL-MCNC: 5.5 G/DL — LOW (ref 6–8)
SODIUM SERPL-SCNC: 144 MMOL/L — SIGNIFICANT CHANGE UP (ref 135–146)
TRIGL SERPL-MCNC: 111 MG/DL — SIGNIFICANT CHANGE UP
TSH SERPL-MCNC: 2.35 UIU/ML — SIGNIFICANT CHANGE UP (ref 0.27–4.2)

## 2024-07-24 PROCEDURE — 93306 TTE W/DOPPLER COMPLETE: CPT | Mod: 26

## 2024-07-24 PROCEDURE — 99223 1ST HOSP IP/OBS HIGH 75: CPT

## 2024-07-24 PROCEDURE — 93010 ELECTROCARDIOGRAM REPORT: CPT

## 2024-07-24 PROCEDURE — 71045 X-RAY EXAM CHEST 1 VIEW: CPT | Mod: 26

## 2024-07-24 PROCEDURE — 99232 SBSQ HOSP IP/OBS MODERATE 35: CPT

## 2024-07-24 RX ADMIN — ENOXAPARIN SODIUM 40 MILLIGRAM(S): 120 INJECTION SUBCUTANEOUS at 23:10

## 2024-07-24 RX ADMIN — ENOXAPARIN SODIUM 40 MILLIGRAM(S): 120 INJECTION SUBCUTANEOUS at 12:34

## 2024-07-24 RX ADMIN — LOSARTAN POTASSIUM 100 MILLIGRAM(S): 50 TABLET, FILM COATED ORAL at 05:48

## 2024-07-24 RX ADMIN — PANTOPRAZOLE SODIUM 40 MILLIGRAM(S): 20 TABLET, DELAYED RELEASE ORAL at 05:48

## 2024-07-24 RX ADMIN — OXYBUTYNIN CHLORIDE 5 MILLIGRAM(S): 5 TABLET, FILM COATED, EXTENDED RELEASE ORAL at 18:26

## 2024-07-24 RX ADMIN — OXYBUTYNIN CHLORIDE 5 MILLIGRAM(S): 5 TABLET, FILM COATED, EXTENDED RELEASE ORAL at 05:48

## 2024-07-24 RX ADMIN — Medication 81 MILLIGRAM(S): at 12:35

## 2024-07-24 RX ADMIN — CLOPIDOGREL BISULFATE 75 MILLIGRAM(S): 75 TABLET, FILM COATED ORAL at 12:35

## 2024-07-24 RX ADMIN — Medication 100 MILLIGRAM(S): at 05:48

## 2024-07-24 RX ADMIN — Medication 100 MILLIGRAM(S): at 18:26

## 2024-07-24 RX ADMIN — ATORVASTATIN CALCIUM 80 MILLIGRAM(S): 40 TABLET, FILM COATED ORAL at 21:33

## 2024-07-24 NOTE — OCCUPATIONAL THERAPY INITIAL EVALUATION ADULT - SHORT TERM MEMORY, REHAB EVAL
minimal-moderate impairment; pt was able to repeat 3/3 words immediately; able to recall 2/3 words with 2 options provided post ~10 minutes./impaired

## 2024-07-24 NOTE — CONSULT NOTE ADULT - ASSESSMENT
IMPRESSION: Rehab of left corona  radiata stroke, dysarthria, dysphagia. She amb 50 ft RW cg assist.  She has bilateral knee OA; she has received recent viscosupplementation shots.     PRECAUTIONS: [x  ] Cardiac  [  ] Respiratory  [  ] Seizures [  ] Contact Isolation  [  ] Droplet Isolation  [  ] Other    Weight Bearing Status:     RECOMMENDATION:    Out of Bed to Chair     DVT/Decubiti Prophylaxis    REHAB PLAN:     [ x  ] Bedside P/T 3-5 times a week   [ x  ]   Bedside O/T  2-3 times a week             [   ] No Rehab Therapy Indicated                   [ x  ]  Speech Therapy   Conditioning/ROM                                    ADL  Bed Mobility                                               Conditioning/ROM  Transfers                                                     Bed Mobility  Sitting /Standing Balance                         Transfers                                        Gait Training                                               Sitting/Standing Balance  Stair Training [   ]Applicable                    Home equipment Eval                                                                        Splinting  [   ] Only      GOALS:   ADL   [ x  ]   Independent                    Transfers  [ x  ] Independent                          Ambulation  [  x ] Independent     [ x   ] With device                            [   ]  CG                                                         [   ]  CG                                                                  [   ] CG                            [    ] Min A                                                   [   ] Min A                                                              [   ] Min  A          DISCHARGE PLAN:   [ xx  ]  Good candidate for Intensive Rehabilitation/Hospital based-4A [facility]                                             Will tolerate 3hrs Intensive Rehab Daily                                       [    ]  Short Term Rehab in Skilled Nursing Facility                                       [    ]  Home with Outpatient or VN services                                         [    ]  Possible Candidate for Intensive Hospital based Rehab

## 2024-07-24 NOTE — PROGRESS NOTE ADULT - SUBJECTIVE AND OBJECTIVE BOX
Neurology Stroke Progress Note    INTERVAL HPI/OVERNIGHT EVENTS:  Patient seen and examined.     MEDICATIONS  (STANDING):  aspirin enteric coated 81 milliGRAM(s) Oral daily  atorvastatin 80 milliGRAM(s) Oral at bedtime  clopidogrel Tablet 75 milliGRAM(s) Oral daily  clopidogrel Tablet      enoxaparin Injectable 40 milliGRAM(s) SubCutaneous every 24 hours  losartan 100 milliGRAM(s) Oral daily  metoprolol tartrate 100 milliGRAM(s) Oral two times a day  oxybutynin 5 milliGRAM(s) Oral two times a day  pantoprazole    Tablet 40 milliGRAM(s) Oral before breakfast    MEDICATIONS  (PRN):    Allergies    No Known Allergies    Intolerances      Vital Signs Last 24 Hrs  T(C): 36.6 (24 Jul 2024 04:37), Max: 37.2 (23 Jul 2024 19:41)  T(F): 97.8 (24 Jul 2024 04:37), Max: 98.9 (23 Jul 2024 19:41)  HR: 52 (24 Jul 2024 04:37) (52 - 65)  BP: 148/63 (24 Jul 2024 04:37) (148/63 - 172/81)  BP(mean): 100 (24 Jul 2024 03:34) (100 - 100)  RR: 18 (24 Jul 2024 04:37) (16 - 18)  SpO2: 98% (24 Jul 2024 04:37) (95% - 98%)    Parameters below as of 24 Jul 2024 04:37  Patient On (Oxygen Delivery Method): room air        Physical exam:  General: No acute distress, awake and alert    Neurologic:  -Mental status: Awake, alert, oriented to person, place, and time. Speech is fluent with intact naming, repetition, and comprehension, no dysarthria. Recent and remote memory intact. Follows commands. Attention/concentration intact. Fund of knowledge appropriate.  -Cranial nerves:   II: Visual fields are full to confrontation.  III, IV, VI: Extraocular movements are intact without nystagmus. Pupils equally round and reactive to light  V:  Facial sensation V1-V3 equal and intact   VII: Face is symmetric with normal eye closure and smile  VIII: Hearing is bilaterally intact to finger rub  IX, X: Uvula is midline and soft palate rises symmetrically  XI: Head turning and shoulder shrug are intact.  XII: Tongue protrudes midline  Motor: Normal bulk and tone. No pronator drift. Strength bilateral upper extremity 5/5, bilateral lower extremities 5/5.  Rapid alternating movements intact and symmetric  Sensation: Intact to light touch bilaterally. No neglect or extinction on double simultaneous testing.  Coordination: No dysmetria on finger-to-nose and heel-to-shin bilaterally  Reflexes: Downgoing toes bilaterally   Gait: Narrow gait and steady    NIHSS:     LABS:                        13.0   7.07  )-----------( 201      ( 23 Jul 2024 20:58 )             38.2     07-23    141  |  110  |  15  ----------------------------<  144<H>  4.3   |  17  |  0.8    Ca    10.7<H>      23 Jul 2024 20:58  Mg     1.6     07-23    TPro  6.2  /  Alb  3.9  /  TBili  1.0  /  DBili  x   /  AST  35  /  ALT  13  /  AlkPhos  96  07-23      Urinalysis Basic - ( 23 Jul 2024 20:58 )    Color: x / Appearance: x / SG: x / pH: x  Gluc: 144 mg/dL / Ketone: x  / Bili: x / Urobili: x   Blood: x / Protein: x / Nitrite: x   Leuk Esterase: x / RBC: x / WBC x   Sq Epi: x / Non Sq Epi: x / Bacteria: x        RADIOLOGY & ADDITIONAL TESTS:      CT HEAD (7/23/2024): No evidence of acute intracranial hemorrhage, mass, or midline shift. Chronic microvascular changes.    CTA (7/23/2024): Negative CTA of the head and neck. No evidence of major vascular stenosis, occlusion, or aneurysm.     Neurology Stroke Progress Note    INTERVAL HPI/OVERNIGHT EVENTS:  Patient seen and examined. States she is feeling worse than she was yesterday. She is still having difficulty speaking and some weakness in her right upper extremity.     MEDICATIONS  (STANDING):  aspirin enteric coated 81 milliGRAM(s) Oral daily  atorvastatin 80 milliGRAM(s) Oral at bedtime  clopidogrel Tablet 75 milliGRAM(s) Oral daily  clopidogrel Tablet      enoxaparin Injectable 40 milliGRAM(s) SubCutaneous every 24 hours  losartan 100 milliGRAM(s) Oral daily  metoprolol tartrate 100 milliGRAM(s) Oral two times a day  oxybutynin 5 milliGRAM(s) Oral two times a day  pantoprazole    Tablet 40 milliGRAM(s) Oral before breakfast    MEDICATIONS  (PRN):    Allergies    No Known Allergies    Intolerances      Vital Signs Last 24 Hrs  T(C): 36.6 (24 Jul 2024 04:37), Max: 37.2 (23 Jul 2024 19:41)  T(F): 97.8 (24 Jul 2024 04:37), Max: 98.9 (23 Jul 2024 19:41)  HR: 52 (24 Jul 2024 04:37) (52 - 65)  BP: 148/63 (24 Jul 2024 04:37) (148/63 - 172/81)  BP(mean): 100 (24 Jul 2024 03:34) (100 - 100)  RR: 18 (24 Jul 2024 04:37) (16 - 18)  SpO2: 98% (24 Jul 2024 04:37) (95% - 98%)    Parameters below as of 24 Jul 2024 04:37  Patient On (Oxygen Delivery Method): room air        Physical exam:  General: No acute distress, awake and alert    Neurologic:  -Mental status: Awake, alert, oriented to person, place, and time. Speech is aphasic and slightly dysarthric. Intact naming, repetition, and comprehension. Recent and remote memory intact. Follows commands. Attention/concentration intact. Fund of knowledge appropriate.  -Cranial nerves:   II: Visual fields are full to confrontation.  III, IV, VI: Extraocular movements are intact without nystagmus. Pupils equally round and reactive to light BL  V:  Facial sensation V1-V3 equal and intact   VII: Face is symmetric with normal eye closure and smile  VIII: Hearing is bilaterally intact to finger rub  IX, X: Uvula is midline and soft palate rises symmetrically  XI: Head turning and shoulder shrug are intact.  XII: Tongue protrudes midline  Motor: Normal bulk and tone. No pronator drift. Strength right proximal upper extremity 4/5, right distal upper extremity 3/5. LUE 4/5 bilaterally proximal and distal.  Bilateral lower extremities 4/5.  Rapid alternating movements intact and symmetric  Sensation: Intact to light touch bilaterally. No neglect or extinction on double simultaneous testing.  Coordination: No dysmetria on finger-to-nose and heel-to-shin bilaterally  Reflexes: Downgoing toes bilaterally   Gait: Deferred    NIHSS: 2 (aphasia and dysarthria)    LABS:                        13.0   7.07  )-----------( 201      ( 23 Jul 2024 20:58 )             38.2     07-23    141  |  110  |  15  ----------------------------<  144<H>  4.3   |  17  |  0.8    Ca    10.7<H>      23 Jul 2024 20:58  Mg     1.6     07-23    TPro  6.2  /  Alb  3.9  /  TBili  1.0  /  DBili  x   /  AST  35  /  ALT  13  /  AlkPhos  96  07-23      Urinalysis Basic - ( 23 Jul 2024 20:58 )    Color: x / Appearance: x / SG: x / pH: x  Gluc: 144 mg/dL / Ketone: x  / Bili: x / Urobili: x   Blood: x / Protein: x / Nitrite: x   Leuk Esterase: x / RBC: x / WBC x   Sq Epi: x / Non Sq Epi: x / Bacteria: x        RADIOLOGY & ADDITIONAL TESTS:      CT HEAD (7/23/2024): No evidence of acute intracranial hemorrhage, mass, or midline shift. Chronic microvascular changes.    CTA (7/23/2024): Negative CTA of the head and neck. No evidence of major vascular stenosis, occlusion, or aneurysm.

## 2024-07-24 NOTE — PHYSICAL THERAPY INITIAL EVALUATION ADULT - ADDITIONAL COMMENTS
Pt lives in private house with 6 steps to enter 14 steps inside, pt lives with spouse. Pt ambulated independently with SC in home,  assists her with dressing, pt bathes with supervision.

## 2024-07-24 NOTE — OCCUPATIONAL THERAPY INITIAL EVALUATION ADULT - ORIENTATION, REHAB EVAL
Pt stated that she has no idea what happened to her and why she is in the hospital, reoriented to situation./person/place/time

## 2024-07-24 NOTE — OCCUPATIONAL THERAPY INITIAL EVALUATION ADULT - FINE MOTOR COORDINATION, RIGHT HAND THUMB/FINGER OPPOSITION SKILLS, OT EVAL
increased processing time, observed slight difficulties in performing thumb to 5th finger/mild impairment

## 2024-07-24 NOTE — PHYSICAL THERAPY INITIAL EVALUATION ADULT - PERTINENT HX OF CURRENT PROBLEM, REHAB EVAL
82-year-old right handed, no substance use, living with , retired; with PMHx of HTN, HLD, urinary incontinence, GERD; presented today for evaluation of stroke.  Patient had MRI performed which is indicative of stroke. Patient presented 4 days ago in the morning with dysarthria and difficulty swallow that wouldn't resolve. Initially she thought it was a cold, but finally today she visited her PCP Dr. Dominguez, who ordered the workup and referred her to ED for evaluation. Patient will be admitted to neurology for further workup and management

## 2024-07-24 NOTE — CONSULT NOTE ADULT - TIME BILLING
The patient requires acute rehab with 3 hours of daily therapies at least 5 out of 7 days and close physiatry follow up. I communicated with PT, the patient, her spouse, the dtr. I spoke to the patient about acute rehab and recovery of stroke.

## 2024-07-24 NOTE — SWALLOW BEDSIDE ASSESSMENT ADULT - SLP PERTINENT HISTORY OF CURRENT PROBLEM
82-yo female with PMHx of HTN, HLD, urinary incontinence, GERD. Presented today for evaluation of stroke. Patient presented 4 days ago in the morning with dysarthria and difficulty swallow that wouldn't resolve. Pt. visited her PCP Dr. Dominguez, who ordered the workup. Found to have acute infarct in left corona radiate on MRI. CTA did not show evidence of major vascular stenosis, occlusion, or aneurysm, CXR is WFL.

## 2024-07-24 NOTE — OCCUPATIONAL THERAPY INITIAL EVALUATION ADULT - PERTINENT HX OF CURRENT PROBLEM, REHAB EVAL
Pt is an 82-year-old right handed, no substance use, living with , retired; with PMHx of HTN, HLD, urinary incontinence, GERD; presented today for evaluation of stroke.  Patient had MRI performed which is indicative of stroke. Patient presented 4 days ago in the morning with dysarthria and difficulty swallow that wouldn't resolve. Initially she thought it was a cold, but finally today she visited her PCP Dr. Dominguez, who ordered the workup and referred her to ED for evaluation. Patient will be admitted to neurology for further workup and management.

## 2024-07-24 NOTE — CONSULT NOTE ADULT - TIME-BASED
Chief Complaint: head laceration      TIME SEEN: 1836    HPI: This is a 39-year-old male who presents via EMS after he tripped over a box and striking his forehead on a glass on a table at home.  Patient states he did not break the table or have any other injury.  Patient reports there is a lot of bleeding coming from the forehead laceration.  Patient reports feeling head pressure.  Patient does not take any anticoagulants. Last Tdap in 2021 per EMR.  Denies loss of consciousness, neck changes, lightheadedness, dizziness, nausea, vomiting, numbness, weakness, other injury.    Review of Systems   Constitutional: Negative for fever.   HENT: Negative for congestion.    Eyes: Negative for visual disturbance.   Respiratory: Negative for cough and shortness of breath.    Cardiovascular: Negative for chest pain.   Gastrointestinal: Negative for nausea and vomiting.   Endocrine: Negative.    Genitourinary: Negative.    Musculoskeletal: Negative for neck pain.   Skin: Positive for wound (forehead laceration).   Allergic/Immunologic: Negative for immunocompromised state.   Neurological: Positive for headaches. Negative for dizziness, seizures, syncope, weakness and light-headedness.   Hematological: Negative.    Psychiatric/Behavioral: Negative for confusion.   All other systems reviewed and are negative.       PAST MEDICAL HISTORY: none per pt    PAST SURGICAL HISTORY: none per pt    SOCIAL HISTORY: lives at home    FAMILY HISTORY: Reviewed, no pertinent family history to today's complaint      ED Triage Vitals [07/24/22 1800]   Enc Vitals Group      /79      Heart Rate 90      Resp 18      Temp 98.5 °F (36.9 °C)      Temp src Oral      SpO2 100 %      Weight 140 lb (63.5 kg)      Height       Head Circumference       Peak Flow       Pain Score       Pain Loc       Pain Edu?       Excl. in GC?         Physical Exam  Vitals and nursing note reviewed.   Constitutional:       Appearance: He is not toxic-appearing.   HENT:       Head:      Comments: 3 cm linear laceration to the superior central forehead.  Slight oozing of blood.  No visible foreign body.    Superficial 0.25 cm deep abrasion to the right forehead.  No active bleeding.  No visible foreign body.     Mouth/Throat:      Mouth: Mucous membranes are moist.   Eyes:      Extraocular Movements: Extraocular movements intact.      Conjunctiva/sclera: Conjunctivae normal.      Pupils: Pupils are equal, round, and reactive to light.   Cardiovascular:      Rate and Rhythm: Normal rate.   Pulmonary:      Effort: Pulmonary effort is normal.   Musculoskeletal:      Cervical back: Neck supple.      Right lower leg: No edema.      Left lower leg: No edema.   Skin:     General: Skin is warm and dry.   Neurological:      Mental Status: He is alert and oriented to person, place, and time.      Gait: Gait is intact.   Psychiatric:         Mood and Affect: Mood normal.         Behavior: Behavior normal.                Imaging Results          CT HEAD WO CONTRAST (Final result)  Result time 07/24/22 19:25:23    Final result                 Impression:    No acute intracranial hemorrhage or other acute intracranial process.    Electronically Signed by: VISHNU LUCIO M.D.   Signed on: 7/24/2022 7:25 PM                Narrative:    EXAM: CT HEAD WO CONTRAST    CLINICAL INDICATION: head injury, forehead lac    COMPARISON: None.    TECHNIQUE: Noncontrast CT of the head was performed. Automated exposure  control was employed as a radiation dose reduction strategy on this  patient.    FINDINGS:   There is no evidence of acute intracranial hemorrhage.    There is no definite evidence of acute ischemia. The gray-white  differentiation is preserved.    The ventricular system and sulci are normal for patient's age.    No displaced calvarial fracture or aggressive osseous lesions. Nonspecific  soft tissue thickening in the paranasal sinuses. Mastoid air cells are  clear but hypoplastic on the  right.     Small left frontal scalp laceration with small linear radiopaque foreign  bodies.                                  Medications   acetaminophen (TYLENOL) tablet 650 mg (650 mg Oral Given 7/24/22 1945)             Vitals:    07/24/22 1800 07/24/22 1940   BP: 122/79 127/83   BP Location: RUE - Right upper extremity    Patient Position: Sitting    Pulse: 90 86   Resp: 18 18   Temp: 98.5 °F (36.9 °C) 97.6 °F (36.4 °C)   TempSrc: Oral    SpO2: 100% 99%   Weight: 63.5 kg (140 lb)      Laceration Repair-Face  INFORMED CONSENT: The risks and benefits of the procedure were explained and the patient verbalized their understanding and wished to proceed with the procedure.    PROCEDURE: Area prepped and draped in usual sterile fashion. Local anesthesia achieved using 1% Lidocaine with epinephrine. Wound was copiously irrigated. Wound was inspected and no foreign bodies seen. 8 6-0 nylon single interrupted sutures were placed. A sterile bandage was applied. Patient tolerated procedure well. Patient was given instructions on how to care for the wound.      MDM: This is a 39-year-old male who presents via EMS after he tripped over a box and striking his forehead on a glass on a table at home.  No loss of consciousness.  Forehead laceration was appear to sutures.  Patient and wife would like to confirm there is no skull fracture or bleeding inside the brain so a head CT was ordered.  Head CT negative for acute intracranial process.  Wound care and head precautions discussed with patient and wife.  Patient discharged home with primary care follow-up.  Patient to return to ER with any worsening symptoms which were discussed and provided.        ED Diagnoses     Diagnosis Comment Associated Orders       Final diagnoses    Laceration of forehead, initial encounter -- --    Injury of head, initial encounter -- --                 Franci Ybarra MD  [id number] [facility] [address]  [facility] [place] [address]  [phone]    In 3 days  For  wound re-check; SUTURE REMOVAL IN ABOUT 7 DAYS        DISPOSITION: Home      This case was endorsed to Dr. Jed Juarez PA-C  07/24/22 1954  I have made myself available to the physician assistant and agree with history physical and work-up as well as disposition, Dr. Marci Adkins, DO  07/24/22 0123     55

## 2024-07-24 NOTE — PROGRESS NOTE ADULT - ASSESSMENT
83 y/o right handed woman with PMH of HTN, hyperlipidemia, chronic LE edema, urinary incontinence and GERD presented to the ED on 7/23 for evaluation of stroke. Per family, she developed dysarthria and right sided weakness and numbness about 4 days prior to admission but refused to seek medical attention. When she finally saw her PCP Dr. Jonathan Hurley, he suspected a stroke and sent her for a CT scan and then she had a MRI which showed acute infarct in left corona radiata/basal ganglia. CTA did not show evidence of major vascular stenosis, occlusion, or aneurysm.  Home meds reviewed at bedside    1. Acute stroke of left corona radiata/basal ganglia  dysarthria, right sided weakness  on ASA/Plavix and high intensity statin  management per neurology  possible etiology small vessel   neuro checks  Goal -160  speech and swallow eval in progress  EKG reviewed - NSR  LDL 84  A1c 6  EP consulted for loop recorder  ECHO report reviewed: EF 68%, mild AI  continue PT/OT  rehab consult: inpt rehab candidate    2. HTN - on metoprolol and ARB    3. Hyperlipidemia on high intensity statin    4. Urinary incontinence  home med: Myrbetriq and tolterodine  on oxybutynin 5 mg BID here    5. GERD on PPI    6. DVT Prophylaxis: Lovenox       full code  will follow with you - contact via Teams for questions/concerns

## 2024-07-24 NOTE — OCCUPATIONAL THERAPY INITIAL EVALUATION ADULT - VISUAL ACUITY
+has reading glasses +no reported double vision, mentioned right eye decreased acuity has been months

## 2024-07-24 NOTE — PROGRESS NOTE ADULT - ASSESSMENT
82-year-old right handed, no substance use, living with , retired; with PMHx of HTN, HLD, urinary incontinence, GERD; presented today for evaluation of stroke. Patient presented 4 days ago in the morning with dysarthria and difficulty swallow that wouldn't resolve. Initially she thought it was a cold, but finally today she visited her PCP Dr. Dominguez, who ordered the workup. Found to have acute infarct in left corona radiate on MRI. CTA did not show evidence of major vascular stenosis, occlusion, or aneurysm.    #Cerebral infarction in left corona radiata   Etiology (pending workup)-likely small vessel disease??  - S/P plavix 300 mg x1  - continue aspirin 81mg and plavix 75mg daily  - continue atorvastatin 80mg daily  - q4hr stroke neuro checks and vitals  - Goal -160  - A1c/TSH/Lipid panel  - Speech and swallo evaluation  - PT/OT    #HTN  - permissive hypertension, Goal -180  - continue home blood pressure medication metoprolol and irbersartan  - obtain TTE with bubble    #HLD  Hold home med pravastatin  - high dose statin as above in CVA  - LDL results: Pending    #Urinary incontinence  On home med Myrbetriq and tolterodine  Hold tolterodine for now  - Continue Myrbetriq 50 mg QD  - Start oxybutynin 5 mg BID    #Misc:   DVT Prophylaxis: Lovenox sq  GI prophylaxis for GERD: Pantoprazole 40 mg QD  Diet: NPO pending S/S  Dispo: Tele  82-year-old right handed, no substance use, living with , retired; with PMHx of HTN, HLD, urinary incontinence, GERD; presented today for evaluation of stroke. Patient presented 4 days ago in the morning with dysarthria and difficulty swallow that wouldn't resolve. Initially she thought it was a cold, but finally today she visited her PCP Dr. Dominguez, who ordered the workup. Found to have acute infarct in left corona radiata/basal ganglia on MRI. CTA did not show evidence of major vascular stenosis, occlusion, or aneurysm.    #Cerebral infarction in left corona radiata/basal ganglia   Etiology likely small vessel disease vs ESUS. LDL 84, A1c 6  S/P plavix 300 mg x1  - continue aspirin 81mg and plavix 75mg daily for 21 days   - continue atorvastatin 80mg daily  - q8hr stroke neuro checks and vitals  - Goal -160  - Speech and swallo evaluation  - PT/OT    #HTN  - Goal -180  - continue home blood pressure medication metoprolol and irbersartan  - TTE     #HLD  Hold home med pravastatin  - C/w high dose statin as above in CVA  - LDL results: 84    #Urinary incontinence  On home med Myrbetriq and tolterodine  Hold tolterodine for now  - Continue Myrbetriq 50 mg QD (needs to be verified)  - C/w oxybutynin 5 mg BID    #Misc:   DVT Prophylaxis: Lovenox sq  GI prophylaxis for GERD: Pantoprazole 40 mg QD  Diet: NPO pending S/S  Dispo: Tele  82-year-old right handed, no substance use, living with , retired; with PMHx of HTN, HLD, urinary incontinence, GERD; presented today for evaluation of stroke. Patient presented 4 days ago in the morning with dysarthria and difficulty swallow that wouldn't resolve. Initially she thought it was a cold, but finally today she visited her PCP Dr. Dominguez, who ordered the workup. Found to have acute infarct in left corona radiata/basal ganglia on MRI. CTA did not show evidence of major vascular stenosis, occlusion, or aneurysm.    #Cerebral infarction in left corona radiata/basal ganglia   Etiology likely small vessel disease vs ESUS. LDL 84, A1c 6  S/P plavix 300 mg x1  - continue aspirin 81mg and plavix 75mg daily for 21 days   - continue atorvastatin 80mg daily  - q8hr stroke neuro checks and vitals  - Goal -160  - Speech and swallow evaluation  - PT/OT    #HTN  - Goal -180  - continue home blood pressure medication metoprolol and irbersartan  - TTE     #HLD  Hold home med pravastatin  - C/w high dose statin as above in CVA  - LDL results: 84    #Urinary incontinence  On home med Myrbetriq and tolterodine  Hold tolterodine for now  - Continue Myrbetriq 50 mg QD (needs to be verified)  - C/w oxybutynin 5 mg BID    #Misc:   DVT Prophylaxis: Lovenox sq  GI prophylaxis for GERD: Pantoprazole 40 mg QD  Diet: NPO pending S/S  Dispo: Tele

## 2024-07-24 NOTE — PATIENT PROFILE ADULT - FALL HARM RISK - RISK INTERVENTIONS

## 2024-07-24 NOTE — PHYSICAL THERAPY INITIAL EVALUATION ADULT - GENERAL OBSERVATIONS, REHAB EVAL
13:10-13:50 Pt encountered semi thomson in bed in NAD with +call bell, +bed rails, +bed alarm, +IV removed prior to session, agreeable to therapy.

## 2024-07-24 NOTE — OCCUPATIONAL THERAPY INITIAL EVALUATION ADULT - ADDITIONAL COMMENTS
+pt reported 2x falls past year +spouse reported that pt required assistance in stair negotiation, standby assistance for tub transfer, and spouse always with pt when goes outdoors +pt reported has been dropping things in right hand time to time for almost a year

## 2024-07-24 NOTE — OCCUPATIONAL THERAPY INITIAL EVALUATION ADULT - PATIENT PROFILE REVIEW, REHAB EVAL
Pt's chart reviewed prior to OT eval./yes
Pt's chart reviewed prior to OT eval. Eval time: 14:30-15:00/yes

## 2024-07-24 NOTE — CONSULT NOTE ADULT - REASON FOR ADMISSION
Left corona radiata cerebral infarction after 4 days of dysarthria and dysphagia
Left corona radiata cerebral infarction after 4 days of dysarthria and dysphagia

## 2024-07-24 NOTE — SWALLOW BEDSIDE ASSESSMENT ADULT - COMMENTS
82-year-old right handed, no substance use, living with , retired; with PMHx of HTN, HLD, urinary incontinence, GERD; presented today for evaluation of stroke. Patient presented 4 days ago in the morning with dysarthria and difficulty swallow that wouldn't resolve. Initially she thought it was a cold, but finally today she visited her PCP Dr. Dominguez, who ordered the workup. Found to have acute infarct in left corona radiate on MRI. CTA did not show evidence of major vascular stenosis, occlusion, or aneurysm.    #Cerebral infarction in left corona radiata   Etiology (pending workup)-likely small vessel disease?? #Cerebral infarction in left corona radiata   Etiology (pending workup)-likely small vessel disease??

## 2024-07-24 NOTE — PROGRESS NOTE ADULT - SUBJECTIVE AND OBJECTIVE BOX
BEV MORGAN  83y Female    INTERVAL HPI/OVERNIGHT EVENTS:    pt seen with family (, son and daughter) at bedside this morning  she still has dysarthria and right sided weakness  has chronic LE edema of unknown cause per family  she worked with PT and is a 4A rehab candidate per notes.    T(F): 97.5 (07-24-24 @ 13:29), Max: 98.9 (07-23-24 @ 19:41)  HR: 79 (07-24-24 @ 13:29) (52 - 79)  BP: 163/66 (07-24-24 @ 13:29) (148/63 - 172/81)  RR: 18 (07-24-24 @ 13:29) (16 - 18)  SpO2: 99% (07-24-24 @ 13:29) (95% - 99%) on RA    PHYSICAL EXAM:  GENERAL: NAD  HEAD:  Normocephalic  EYES:  conjunctiva and sclera clear  ENMT: Moist mucous membranes  NERVOUS SYSTEM:  Alert, awake, Good concentration, + dysarthria but understandable speech, no facial droop noted, follows commands, no ataxia on FTN, no pronator drip, right UE 4/5, right LE 3/5, left LE 3-4/5, sensation intact, + Babinski on the right  CHEST/LUNG: CTA b/l  HEART: Regular rate and rhythm; No murmurs  ABDOMEN: Soft, Nontender, Nondistended; Bowel sounds present  EXTREMITIES:   + mild LE edema  SKIN: warm, dry    Consultant(s) Notes Reviewed:  [x ] YES  [ ] NO  Care Discussed with Consultants/Other Providers [ x] YES  [ ] NO    MEDICATIONS  (STANDING):  aspirin enteric coated 81 milliGRAM(s) Oral daily  atorvastatin 80 milliGRAM(s) Oral at bedtime  clopidogrel Tablet      clopidogrel Tablet 75 milliGRAM(s) Oral daily  enoxaparin Injectable 40 milliGRAM(s) SubCutaneous every 24 hours  losartan 100 milliGRAM(s) Oral daily  metoprolol tartrate 100 milliGRAM(s) Oral two times a day  oxybutynin 5 milliGRAM(s) Oral two times a day  pantoprazole    Tablet 40 milliGRAM(s) Oral before breakfast    MEDICATIONS  (PRN):      LABS:                        13.0   7.07  )-----------( 201      ( 23 Jul 2024 20:58 )             38.2     07-24    144  |  107  |  12  ----------------------------<  101<H>  3.9   |  20  |  0.8    Ca    10.6<H>      24 Jul 2024 04:30  Mg     1.6     07-23    TPro  5.5<L>  /  Alb  3.9  /  TBili  1.2  /  DBili  0.3  /  AST  23  /  ALT  16  /  AlkPhos  92  07-24                RADIOLOGY & ADDITIONAL TESTS:    Imaging or report Personally Reviewed:  [ x] YES  [ ] NO    < from: Xray Chest 1 View-PORTABLE IMMEDIATE (Xray Chest 1 View-PORTABLE IMMEDIATE .) (07.24.24 @ 00:19) >  Impression:    No radiographic evidence of acute cardiopulmonary disease.    < end of copied text >      < from: CT Angio Head w/ IV Cont (07.23.24 @ 21:58) >  IMPRESSION:    CT HEAD:    No evidence of acute intracranial hemorrhage, mass, or midline shift.  Chronic microvascular changes.    CTA:  Negative CTA of the head and neck  No evidence of major vascular stenosis, occlusion, or aneurysm.    < end of copied text >      < from: TTE Echo Complete w/ Contrast w/ Doppler (07.24.24 @ 09:53) >  Summary:   1. Normal left ventricular internal cavity size.   2. Normal global left ventricular systolic function.   3. LV Ejection Fraction by Ayala's Method with a biplane EF of 68 %.   4. Normal right ventricular size and function.   5. Mild aortic regurgitation.   6. Dilatation of the ascending aorta.   7. Color flow doppler and intravenous injection of agitated saline   demonstrates the presence of an intact intra atrial septum.    < end of copied text >              Case discussed with stroke neurology resident and attending today     Care discussed with pt and family         18

## 2024-07-24 NOTE — OCCUPATIONAL THERAPY INITIAL EVALUATION ADULT - LIVES WITH, PROFILE
Pt lives with spouse in a  with 4 steps(BHRs) to enter and another 1FOS(~12 steps, up with right HR) to bedroom/bathroom. +bath tub with no grab bars and no shower chair/spouse

## 2024-07-24 NOTE — PHYSICAL THERAPY INITIAL EVALUATION ADULT - SPECIFY REASON(S)
Attempted to see pt for Initial Evaluation, however pt is off the floor at Echo, will follow up when available.

## 2024-07-24 NOTE — CONSULT NOTE ADULT - SUBJECTIVE AND OBJECTIVE BOX
Patient is a 83y old  Female who presents with a chief complaint of Left corona radiata cerebral infarction after 4 days of dysarthria and dysphagia (24 Jul 2024 06:49)      HPI:  82-year-old right handed, no substance use, living with , retired; with PMHx of HTN, HLD, urinary incontinence, GERD; presented today for evaluation of stroke.  Patient had MRI performed which is indicative of stroke. Patient presented 4 days ago in the morning with dysarthria and difficulty swallow that wouldn't resolve.   Imaging revealed acute CVA    EP was consulted to evaluate pt for loop recorder implant    PAST MEDICAL & SURGICAL HISTORY:  Urinary incontinence      HTN (hypertension)      Hyperlipemia      GERD (gastroesophageal reflux disease)      No significant past surgical history      PREVIOUS DIAGNOSTIC TESTING:      ECHO  FINDINGS:  pending    MEDICATIONS  (STANDING):  aspirin enteric coated 81 milliGRAM(s) Oral daily  atorvastatin 80 milliGRAM(s) Oral at bedtime  clopidogrel Tablet      clopidogrel Tablet 75 milliGRAM(s) Oral daily  enoxaparin Injectable 40 milliGRAM(s) SubCutaneous every 24 hours  losartan 100 milliGRAM(s) Oral daily  metoprolol tartrate 100 milliGRAM(s) Oral two times a day  oxybutynin 5 milliGRAM(s) Oral two times a day  pantoprazole    Tablet 40 milliGRAM(s) Oral before breakfast    MEDICATIONS  (PRN):      FAMILY HISTORY:  No pertinent family history in first degree relatives    SOCIAL HISTORY:    CIGARETTES:  none  ALCOHOL:  none  Past Surgical History:    Allergies:    No Known Allergies    REVIEW OF SYSTEMS:  as above    Vital Signs Last 24 Hrs  T(C): 36.4 (24 Jul 2024 13:29), Max: 37.2 (23 Jul 2024 19:41)  T(F): 97.5 (24 Jul 2024 13:29), Max: 98.9 (23 Jul 2024 19:41)  HR: 79 (24 Jul 2024 13:29) (52 - 79)  BP: 163/66 (24 Jul 2024 13:29) (148/63 - 172/81)  BP(mean): 100 (24 Jul 2024 03:34) (100 - 100)  RR: 18 (24 Jul 2024 13:29) (16 - 18)  SpO2: 99% (24 Jul 2024 13:29) (95% - 99%)    Parameters below as of 24 Jul 2024 13:29  Patient On (Oxygen Delivery Method): room air        PHYSICAL EXAM:    GENERAL: In no apparent distress, well nourished, and hydrated.  HEART: Regular rate and rhythm; No murmurs, rubs, or gallops.  PULMONARY: Clear to auscultation and perfusion.  No rales, wheezing, or rhonchi bilaterally.  ABDOMEN: Soft, Nontender, Nondistended; Bowel sounds present  EXTREMITIES:  2+ Peripheral Pulses, No clubbing, cyanosis, or edema  NEUROLOGICAL: Grossly nonfocal    INTERPRETATION OF TELEMETRY:  NSR    ECG:  < from: 12 Lead ECG (07.23.24 @ 20:22) >  Ventricular Rate 60 BPM    Atrial Rate 60 BPM    P-R Interval 162 ms    QRS Duration 80 ms    Q-T Interval 410 ms    QTC Calculation(Bazett) 410 ms    P Axis 36 degrees    R Axis -14 degrees    T Axis 43 degrees    Diagnosis Line Normal sinus rhythm  Moderate voltage criteria for LVH, may be normal variant  Borderline ECG    < end of copied text >      LABS:                        13.0   7.07  )-----------( 201      ( 23 Jul 2024 20:58 )             38.2     07-24    144  |  107  |  12  ----------------------------<  101<H>  3.9   |  20  |  0.8    Ca    10.6<H>      24 Jul 2024 04:30  Mg     1.6     07-23    TPro  5.5<L>  /  Alb  3.9  /  TBili  1.2  /  DBili  0.3  /  AST  23  /  ALT  16  /  AlkPhos  92  07-24          Urinalysis Basic - ( 24 Jul 2024 04:30 )    Color: x / Appearance: x / SG: x / pH: x  Gluc: 101 mg/dL / Ketone: x  / Bili: x / Urobili: x   Blood: x / Protein: x / Nitrite: x   Leuk Esterase: x / RBC: x / WBC x   Sq Epi: x / Non Sq Epi: x / Bacteria: x    
HPI:  82-year-old right handed, no substance use, living with , retired; with PMHx of HTN, HLD, urinary incontinence, GERD; presented today for evaluation of stroke.  Patient had MRI performed which is indicative of stroke. Patient presented 4 days ago in the morning with dysarthria and difficulty swallow that wouldn't resolve. Initially she thought it was a cold, but finally today she visited her PCP Dr. Dominguez, who ordered the workup and referred her to ED for evaluation. Patient will be admitted to neurology for further workup and management    In ED:    T(C): 37.2 (23 Jul 2024 19:41), Max: 37.2 (23 Jul 2024 19:41)  T(F): 98.9 (23 Jul 2024 19:41), Max: 98.9 (23 Jul 2024 19:41)  HR: 64 (23 Jul 2024 19:41) (64 - 64)  BP: 153/79 (23 Jul 2024 19:41) (153/79 - 153/79)  RR: 18 (23 Jul 2024 19:41) (18 - 18)  SpO2: 95% (23 Jul 2024 19:41) (95% - 95%)  Patient On (Oxygen Delivery Method): room air    Labs: HCO3 17 . Ca 10.7. Mg 1.6    CT HEAD: No evidence of acute intracranial hemorrhage, mass, or midline shift. Chronic microvascular changes.    CTA: Negative CTA of the head and neck. No evidence of major vascular stenosis, occlusion, or aneurysm.         (23 Jul 2024 23:03)      PAST MEDICAL & SURGICAL HISTORY:  Urinary incontinence      HTN (hypertension)      Hyperlipemia      GERD (gastroesophageal reflux disease)      No significant past surgical history          Hospital Course:    TODAY'S SUBJECTIVE & REVIEW OF SYMPTOMS:     Constitutional WNL   Cardio WNL   Resp WNL   GI WNL  Heme WNL  Endo WNL  Skin WNL  MSK WNL  Neuro WNL  Cognitive WNL  Psych WNL      MEDICATIONS  (STANDING):  aspirin enteric coated 81 milliGRAM(s) Oral daily  atorvastatin 80 milliGRAM(s) Oral at bedtime  clopidogrel Tablet 75 milliGRAM(s) Oral daily  clopidogrel Tablet      enoxaparin Injectable 40 milliGRAM(s) SubCutaneous every 24 hours  losartan 100 milliGRAM(s) Oral daily  metoprolol tartrate 100 milliGRAM(s) Oral two times a day  oxybutynin 5 milliGRAM(s) Oral two times a day  pantoprazole    Tablet 40 milliGRAM(s) Oral before breakfast    MEDICATIONS  (PRN):      FAMILY HISTORY:  No pertinent family history in first degree relatives        Allergies    No Known Allergies    Intolerances        SOCIAL HISTORY:    [  ] Etoh  [  ] Smoking  [  ] Substance abuse     Home Environment:  [  ] Home Alone  [x  ] Lives with Family-spouse  [  ] Home Health Aid    Dwelling:  [  ] Apartment  [  ] Private House  [  ] Adult Home  [  ] Skilled Nursing Facility      [  ] Short Term  [  ] Long Term  [x  ] Stairs-6 PUSHPA and a FOS inside       Elevator [  ]    FUNCTIONAL STATUS PTA: (Check all that apply)  Ambulation: [ x  ]Independent    [  ] Dependent     [  ] Non-Ambulatory  Assistive Device: [ x ] SA Cane  [  ]  Q Cane  [  ] Walker  [  ]  Wheelchair  ADL : [ x ] Independent  [  ]  Dependent       Vital Signs Last 24 Hrs  T(C): 36.4 (24 Jul 2024 13:29), Max: 37.2 (23 Jul 2024 19:41)  T(F): 97.5 (24 Jul 2024 13:29), Max: 98.9 (23 Jul 2024 19:41)  HR: 79 (24 Jul 2024 13:29) (52 - 79)  BP: 163/66 (24 Jul 2024 13:29) (148/63 - 172/81)  BP(mean): 100 (24 Jul 2024 03:34) (100 - 100)  RR: 18 (24 Jul 2024 13:29) (16 - 18)  SpO2: 99% (24 Jul 2024 13:29) (95% - 99%)    Parameters below as of 24 Jul 2024 13:29  Patient On (Oxygen Delivery Method): room air          PHYSICAL EXAM: Alert & Oriented X3  GENERAL: NAD, well-groomed, well-developed  HEAD:  Atraumatic, Normocephalic  EYES: EOMI, PERRLA, conjunctiva and sclera clear  NECK: Supple, No JVD, Normal thyroid  CHEST/LUNG: Clear bilaterally; No rales, rhonchi, wheezing, or rubs  HEART: Regular rate and rhythm; No murmurs, rubs, or gallops  ABDOMEN: Soft, Nontender, Nondistended; Bowel sounds present  EXTREMITIES:  2+ Peripheral Pulses, No clubbing, cyanosis, or edema    NERVOUS SYSTEM:  Cranial Nerves 2-12 intact [  ] Abnormal  [  ]  ROM: WFL all extremities [  ]  Abnormal [  ]  Motor Strength: WFL all extremities  [  ]  Abnormal [x  ] RUE and RLE are 4+/5, left side 5/5  Sensation: intact to light touch [  ] Abnormal [  ]  Reflexes: Symmetric [  ]  Abnormal [  ]    FUNCTIONAL STATUS:  Bed Mobility: Independent [  ]  Supervision [  ]  Needs Assistance [  ]  N/A [  ]  Transfers: Independent [  ]  Supervision [  ]  Needs Assistance [  ]  N/A [  ]   Ambulation: Independent [  ]  Supervision [  ]  Needs Assistance [  ]  N/A [  ]  ADL: Independent [  ] Requires Assistance [  ] N/A [  ]      LABS:                        13.0   7.07  )-----------( 201      ( 23 Jul 2024 20:58 )             38.2     07-24    144  |  107  |  12  ----------------------------<  101<H>  3.9   |  20  |  0.8    Ca    10.6<H>      24 Jul 2024 04:30  Mg     1.6     07-23    TPro  5.5<L>  /  Alb  3.9  /  TBili  1.2  /  DBili  0.3  /  AST  23  /  ALT  16  /  AlkPhos  92  07-24      Urinalysis Basic - ( 24 Jul 2024 04:30 )    Color: x / Appearance: x / SG: x / pH: x  Gluc: 101 mg/dL / Ketone: x  / Bili: x / Urobili: x   Blood: x / Protein: x / Nitrite: x   Leuk Esterase: x / RBC: x / WBC x   Sq Epi: x / Non Sq Epi: x / Bacteria: x        RADIOLOGY & ADDITIONAL STUDIES:    Assesment:

## 2024-07-24 NOTE — OCCUPATIONAL THERAPY INITIAL EVALUATION ADULT - GENERAL OBSERVATIONS, REHAB EVAL
Pt encountered seated at b/s recliner in NAD +IV(disconnected by ANY Henriquez). Pt agreed to OT eval with no reported any discomfort/pain/dizziness throughout, BP taken seated at b/s recliner 145/83 initially, post toilet transfer/ambulation using rw seated at b/s recliner 171/90, and post ~3-5 minutes 163/81([id number], ANY Henriquez made aware). Pt left seated at b/s recliner in NAD with family presented at bedside +chair alarm, ANY Henriquez aware.,

## 2024-07-24 NOTE — OCCUPATIONAL THERAPY INITIAL EVALUATION ADULT - ADL RETRAINING, OT EVAL
Pt will perform UB dressing task with supervision by dc. Pt will perform LB dressing task with CGA by dc.

## 2024-07-24 NOTE — OCCUPATIONAL THERAPY INITIAL EVALUATION ADULT - LIGHT TOUCH SENSATION, LUE, REHAB EVAL
Subjective:    CC: Mele Rossi is seen today  for Cerebrovascular Accident       HPI:  Current visit- since his last visit he continues to have right arm and leg weakness.  He has stopped getting home PT and does the exercises himself.  He stopped Plavix as advised but continues to take aspirin 325 mg daily and Lipitor 40 mg daily.  His last lipid panel was as follows-, , LDL 20, HDL 43.  His A1c has also improved and was 7.3.  His blood pressure is well controlled at home now.    since his last visit he is doing about the same and has had no new strokelike symptoms.  He continues to have weakness in the right arm and leg.  He did see rehabilitation at [facility] for Botox injections for his spasticity however they are first trying to see if it will improve with baclofen.  He does have an improvement in his cramping with the baclofen however the stiffness in his arm has remained the same.  He also saw his endocrinologist who made changes to his medications.  He has not had a repeat A1c level yet, last level was 10.6.  His blood pressure now is much better controlled at home.  Has not smoked since his stroke.    Initial visit-  55-year-old male accompanied by his wife with a history of poorly controlled hypertension, diabetes mellitus type 2 presents with a hospital follow-up for stroke.  As per patient he had an episode of right arm and leg weakness in early August along with slurring of speech.  He initially went to an outside facility but was sent back home after a CT head showed no abnormalities.  After that the symptoms worsened and he came back to our hospital where he had an MRI brain that showed an acute left pontine infarct.  His blood pressure was extremely high at the time ().  He had extensive testing including a CTA head and neck that did not show any intra-or extracranial stenosis,  a renal artery ultrasound that did not show any stenosis and an echocardiogram that showed an EF of  over 70% with diastolic dysfunction but no PFO.  His telemetry recordings did not show any evidence of atrial fibrillation.  He had not been on any antiplatelet agent prior to this event and  was started on both aspirin 325 mg and Plavix 75 mg.  He was also started on Lipitor 80 mg.  Lipid panel was as follows-, , LDL 69, HDL 32.  His A1c was 10.6 and he was put on insulin in addition to his other medications.  He also has an upcoming appointment with endocrinology.  Patient was discharged to inpatient rehabilitation  for 4 weeks.  He is currently undergoing home health therapy but continues to have significant weakness in the right arm and leg.    The following portions of the patient's history were reviewed today and updated as of 03/11/2019  : allergies, current medications, past family history, past medical history, past social history, past surgical history and problem list  These document will be scanned to patient's chart.      Current Outpatient Medications:   •  amLODIPine (NORVASC) 10 MG tablet, Take 10 mg by mouth Daily., Disp: , Rfl:   •  aspirin 325 MG tablet, Take 1 tablet by mouth Daily., Disp: , Rfl:   •  atorvastatin (LIPITOR) 40 MG tablet, Take 1 tablet by mouth Daily., Disp: 30 tablet, Rfl: 11  •  baclofen (LIORESAL) 10 MG tablet, TAKE 1/2 TAB BY MOUTH 3 TIMES A DAY 5 DAYS, THEN 1 TAB 3 TIMES A ...  (REFER TO PRESCRIPTION NOTES)., Disp: , Rfl: 0  •  carvedilol (COREG) 12.5 MG tablet, Take 1 tablet by mouth Every 12 (Twelve) Hours., Disp: , Rfl:   •  hydrochlorothiazide (HYDRODIURIL) 25 MG tablet, Take 1 tablet by mouth Daily., Disp: , Rfl:   •  insulin detemir (LEVEMIR) 100 UNIT/ML injection, Inject 30 Units under the skin into the appropriate area as directed Every Morning., Disp: , Rfl: 12  •  insulin lispro (humaLOG) 100 UNIT/ML injection, Inject 0-9 Units under the skin into the appropriate area as directed 4 (Four) Times a Day With Meals & at Bedtime., Disp: , Rfl: 12  •  insulin  "lispro (humaLOG) 100 UNIT/ML injection, Inject 12 Units under the skin into the appropriate area as directed 3 (Three) Times a Day With Meals., Disp: , Rfl: 12  •  metFORMIN (GLUCOPHAGE) 500 MG tablet, Take 500 mg by mouth Daily With Breakfast., Disp: , Rfl:   •  povidone-iodine (BETADINE) 10 % external solution, Apply  topically to the appropriate area as directed Daily., Disp: , Rfl:    Past Medical History:   Diagnosis Date   • Diabetes (CMS/HCC)    • Hypertension    • Stroke (cerebrum) (CMS/HCC)       No past surgical history on file.   Family History   Problem Relation Age of Onset   • Diabetes Mother    • Mental illness Mother    • Hypertension Father       Social History     Socioeconomic History   • Marital status:      Spouse name: Not on file   • Number of children: Not on file   • Years of education: Not on file   • Highest education level: Not on file   Social Needs   • Financial resource strain: Not on file   • Food insecurity - worry: Not on file   • Food insecurity - inability: Not on file   • Transportation needs - medical: Not on file   • Transportation needs - non-medical: Not on file   Occupational History   • Not on file   Tobacco Use   • Smoking status: Current Some Day Smoker     Packs/day: 0.00     Types: Cigarettes   • Smokeless tobacco: Never Used   • Tobacco comment: States Smokes 1 cigeratte per month   Substance and Sexual Activity   • Alcohol use: No   • Drug use: No   • Sexual activity: Not on file   Other Topics Concern   • Not on file   Social History Narrative   • Not on file     Review of Systems   Musculoskeletal: Positive for gait problem.   Neurological: Positive for weakness.   All other systems reviewed and are negative.      Objective:    /76 (BP Location: Right arm, Patient Position: Sitting, Cuff Size: Adult)   Pulse 67   Ht 182.9 cm (72\")   Wt 94.3 kg (208 lb)   SpO2 97%   BMI 28.21 kg/m²     Neurology Exam:    General apperance: obese    Mental status: " Alert, awake and oriented to time place and person.    Recent and Remote memory: Intact.    Attention span and Concentration: Normal.     Language and Speech: Intact- No dysarthria.    Fluency, Naming , Repitition and Comprehension:  Intact    Cranial Nerves:   CN II: Visual fields are full. Intact. Fundi - Normal, No papillederma, Pupils - JAIDEN  CN III, IV and VI: Extraocular movements are intact. Normal saccades.   CN V: Facial sensation is intact.   CN VII: Muscles of facial expression reveal no asymmetry. Intact.   CN VIII: Hearing is intact. Whispered voice intact.   CN IX and X: Palate elevates symmetrically. Intact  CN XI: Shoulder shrug is intact.   CN XII: Tongue is midline without evidence of atrophy or fasciculation.     Ophthalmoscopic exam of optic disc-normal    Motor:  Right UE muscle strength 3/5.  Increased tone    Left UE muscle strength 5/5. Normal tone.      Right LE muscle strength3/5. Normal tone.     Left LE muscle strength 5/5. Normal tone.      Sensory: Normal light touch, vibration and pinprick sensation bilaterally.    DTRs: 2+ bilaterally in upper and lower extremities.    Babinski: Negative bilaterally.    Co-ordination: Normal finger-to-nose, heel to shin B/L.    Rhomberg: Negative.    Gait: Circumduction gait, walks with a cane    Cardiovascular: Regular rate and rhythm without murmur, gallop or rub.    Assessment and Plan:  1. Cerebrovascular accident (CVA), unspecified mechanism (CMS/HCC)  Patient had a left pontine infarct secondary to small vessel disease including hypertension, hyperlipidemia, diabetes, smoking.  I have asked him to continue aspirin 325 mg daily and Lipitor 40 mg daily for goal LDL of less than 70  Maintain blood pressure less than 130/90 and A1c close to 6.5  Patient has quit smoking  I will give him another PT referral for his right-sided weakness  We also filled out his disability paperwork today    - Ambulatory Referral to Physical Therapy       Return in  about 4 months (around 7/11/2019).         Kalyani Willis MD     within normal limits

## 2024-07-24 NOTE — SPEECH LANGUAGE PATHOLOGY EVALUATION - SLP CONVERSATIONAL SPEECH
marked by semantic paraphasias and periods of word finding deficits suspect further challenged by [place] being first language

## 2024-07-24 NOTE — OCCUPATIONAL THERAPY INITIAL EVALUATION ADULT - TRANSFER TRAINING, PT EVAL
Pt will perform bed<>chair transfer using appropriate AD with supervision by dc. Pt will perform sit<stand transfer using appropriate AD with supervision by dc.

## 2024-07-24 NOTE — OCCUPATIONAL THERAPY INITIAL EVALUATION ADULT - RANGE OF MOTION EXAMINATION, UPPER EXTREMITY
except right shoulder AROM ~3/4 range and AAROM WFL; except right hand grasp(closure) AROM ~3/4 range and AAROM WFL(right hand edema observed)/bilateral UE Active ROM was WFL  (within functional limits)

## 2024-07-24 NOTE — OCCUPATIONAL THERAPY INITIAL EVALUATION ADULT - NS ASR FOLLOW COMMAND OT EVAL
benefit from repeat/one step simple instruction with visual and tactile demonstrations to enhance command following/75% of the time/able to follow single-step instructions

## 2024-07-24 NOTE — SWALLOW BEDSIDE ASSESSMENT ADULT - SLP GENERAL OBSERVATIONS
Received in OOB chair, awake, alert, ox4, +mildly dysarthric speech, can effectively communicate basic wants/needs. Spouse, son and daughter at bedside.

## 2024-07-24 NOTE — CONSULT NOTE ADULT - ASSESSMENT
82-year-old right handed, no substance use, living with , retired; with PMHx of HTN, HLD, urinary incontinence, GERD; presented with an acute CVA    Plan: 82-year-old right handed, no substance use, living with , retired; with PMHx of HTN, HLD, urinary incontinence, GERD; presented with an acute CVA    Plan:  Pt is agreeable to loop recorder  Will implant loop on Friday

## 2024-07-24 NOTE — SPEECH LANGUAGE PATHOLOGY EVALUATION - COMMENTS
Overall Pt speech-language/cognitive skills marked by word finding deficits, paraphasias, impaired verbal fluency, decreased speech intelligibility within unknown contexts at the phrase and sentence levels, decreased attention, mental flexibility and STM abilities. Per daughter at bedside, Pt with "good english" at baseline however suspect language skills further challenged by Citizen of [place] being Pt's first language. Will continue to assess across diagnostic Tx. Recommend speech-language intervention to address the aforementioned deficits Pt seen for dysphagia evaluation this date; recommended regular solid and thin liquid diet as tolerated

## 2024-07-25 ENCOUNTER — TRANSCRIPTION ENCOUNTER (OUTPATIENT)
Age: 84
End: 2024-07-25

## 2024-07-25 LAB
ALBUMIN SERPL ELPH-MCNC: 3.9 G/DL — SIGNIFICANT CHANGE UP (ref 3.5–5.2)
ALP SERPL-CCNC: 85 U/L — SIGNIFICANT CHANGE UP (ref 30–115)
ALT FLD-CCNC: 17 U/L — SIGNIFICANT CHANGE UP (ref 0–41)
ANION GAP SERPL CALC-SCNC: 13 MMOL/L — SIGNIFICANT CHANGE UP (ref 7–14)
AST SERPL-CCNC: 31 U/L — SIGNIFICANT CHANGE UP (ref 0–41)
BASOPHILS # BLD AUTO: 0.04 K/UL — SIGNIFICANT CHANGE UP (ref 0–0.2)
BASOPHILS NFR BLD AUTO: 0.7 % — SIGNIFICANT CHANGE UP (ref 0–1)
BILIRUB SERPL-MCNC: 1.1 MG/DL — SIGNIFICANT CHANGE UP (ref 0.2–1.2)
BUN SERPL-MCNC: 12 MG/DL — SIGNIFICANT CHANGE UP (ref 10–20)
CALCIUM SERPL-MCNC: 10.3 MG/DL — SIGNIFICANT CHANGE UP (ref 8.4–10.5)
CHLORIDE SERPL-SCNC: 107 MMOL/L — SIGNIFICANT CHANGE UP (ref 98–110)
CO2 SERPL-SCNC: 21 MMOL/L — SIGNIFICANT CHANGE UP (ref 17–32)
CREAT SERPL-MCNC: 0.8 MG/DL — SIGNIFICANT CHANGE UP (ref 0.7–1.5)
EGFR: 73 ML/MIN/1.73M2 — SIGNIFICANT CHANGE UP
EOSINOPHIL # BLD AUTO: 0.12 K/UL — SIGNIFICANT CHANGE UP (ref 0–0.7)
EOSINOPHIL NFR BLD AUTO: 2.1 % — SIGNIFICANT CHANGE UP (ref 0–8)
GLUCOSE SERPL-MCNC: 91 MG/DL — SIGNIFICANT CHANGE UP (ref 70–99)
HCT VFR BLD CALC: 39.5 % — SIGNIFICANT CHANGE UP (ref 37–47)
HGB BLD-MCNC: 13.3 G/DL — SIGNIFICANT CHANGE UP (ref 12–16)
IMM GRANULOCYTES NFR BLD AUTO: 0.7 % — HIGH (ref 0.1–0.3)
LYMPHOCYTES # BLD AUTO: 1.12 K/UL — LOW (ref 1.2–3.4)
LYMPHOCYTES # BLD AUTO: 19.3 % — LOW (ref 20.5–51.1)
MAGNESIUM SERPL-MCNC: 1.9 MG/DL — SIGNIFICANT CHANGE UP (ref 1.8–2.4)
MCHC RBC-ENTMCNC: 32.2 PG — HIGH (ref 27–31)
MCHC RBC-ENTMCNC: 33.7 G/DL — SIGNIFICANT CHANGE UP (ref 32–37)
MCV RBC AUTO: 95.6 FL — SIGNIFICANT CHANGE UP (ref 81–99)
MONOCYTES # BLD AUTO: 0.55 K/UL — SIGNIFICANT CHANGE UP (ref 0.1–0.6)
MONOCYTES NFR BLD AUTO: 9.5 % — HIGH (ref 1.7–9.3)
NEUTROPHILS # BLD AUTO: 3.92 K/UL — SIGNIFICANT CHANGE UP (ref 1.4–6.5)
NEUTROPHILS NFR BLD AUTO: 67.7 % — SIGNIFICANT CHANGE UP (ref 42.2–75.2)
NRBC # BLD: 0 /100 WBCS — SIGNIFICANT CHANGE UP (ref 0–0)
PLATELET # BLD AUTO: 183 K/UL — SIGNIFICANT CHANGE UP (ref 130–400)
PMV BLD: 9.7 FL — SIGNIFICANT CHANGE UP (ref 7.4–10.4)
POTASSIUM SERPL-MCNC: 3.8 MMOL/L — SIGNIFICANT CHANGE UP (ref 3.5–5)
POTASSIUM SERPL-SCNC: 3.8 MMOL/L — SIGNIFICANT CHANGE UP (ref 3.5–5)
PROT SERPL-MCNC: 5.8 G/DL — LOW (ref 6–8)
RBC # BLD: 4.13 M/UL — LOW (ref 4.2–5.4)
RBC # FLD: 13.2 % — SIGNIFICANT CHANGE UP (ref 11.5–14.5)
SODIUM SERPL-SCNC: 141 MMOL/L — SIGNIFICANT CHANGE UP (ref 135–146)
WBC # BLD: 5.79 K/UL — SIGNIFICANT CHANGE UP (ref 4.8–10.8)
WBC # FLD AUTO: 5.79 K/UL — SIGNIFICANT CHANGE UP (ref 4.8–10.8)

## 2024-07-25 PROCEDURE — 99232 SBSQ HOSP IP/OBS MODERATE 35: CPT

## 2024-07-25 PROCEDURE — 99233 SBSQ HOSP IP/OBS HIGH 50: CPT

## 2024-07-25 RX ORDER — LORATADINE 10 MG
17 TABLET,DISINTEGRATING ORAL DAILY
Refills: 0 | Status: DISCONTINUED | OUTPATIENT
Start: 2024-07-25 | End: 2024-07-29

## 2024-07-25 RX ORDER — SENNOSIDES 8.6 MG/1
1 TABLET ORAL DAILY
Refills: 0 | Status: DISCONTINUED | OUTPATIENT
Start: 2024-07-25 | End: 2024-07-29

## 2024-07-25 RX ADMIN — ATORVASTATIN CALCIUM 80 MILLIGRAM(S): 40 TABLET, FILM COATED ORAL at 23:57

## 2024-07-25 RX ADMIN — LOSARTAN POTASSIUM 100 MILLIGRAM(S): 50 TABLET, FILM COATED ORAL at 05:25

## 2024-07-25 RX ADMIN — Medication 100 MILLIGRAM(S): at 18:17

## 2024-07-25 RX ADMIN — OXYBUTYNIN CHLORIDE 5 MILLIGRAM(S): 5 TABLET, FILM COATED, EXTENDED RELEASE ORAL at 18:17

## 2024-07-25 RX ADMIN — CLOPIDOGREL BISULFATE 75 MILLIGRAM(S): 75 TABLET, FILM COATED ORAL at 13:02

## 2024-07-25 RX ADMIN — OXYBUTYNIN CHLORIDE 5 MILLIGRAM(S): 5 TABLET, FILM COATED, EXTENDED RELEASE ORAL at 05:25

## 2024-07-25 RX ADMIN — Medication 17 GRAM(S): at 10:59

## 2024-07-25 RX ADMIN — Medication 81 MILLIGRAM(S): at 13:02

## 2024-07-25 RX ADMIN — ENOXAPARIN SODIUM 40 MILLIGRAM(S): 120 INJECTION SUBCUTANEOUS at 23:58

## 2024-07-25 RX ADMIN — PANTOPRAZOLE SODIUM 40 MILLIGRAM(S): 20 TABLET, DELAYED RELEASE ORAL at 05:25

## 2024-07-25 RX ADMIN — SENNOSIDES 1 TABLET(S): 8.6 TABLET ORAL at 11:00

## 2024-07-25 NOTE — PROGRESS NOTE ADULT - SUBJECTIVE AND OBJECTIVE BOX
CATHY BEV  83y Female    INTERVAL HPI/OVERNIGHT EVENTS:    pt seen with  at bedside  feels OK  still with dysarthria but slightly improved from yesterday  ate breakfast  awaiting bed on 4A when available      T(F): 97 (07-25-24 @ 04:50), Max: 98.2 (07-24-24 @ 20:01)  HR: 54 (07-25-24 @ 04:50) (54 - 80)  BP: 126/54 (07-25-24 @ 04:50) (126/54 - 163/66)  RR: 18 (07-25-24 @ 04:50) (18 - 18)  SpO2: 97% (07-25-24 @ 04:50) (97% - 99%) on RA    PHYSICAL EXAM:  GENERAL: NAD  HEAD:  Normocephalic  EYES:  conjunctiva and sclera clear  ENMT: Moist mucous membranes  NERVOUS SYSTEM:  Alert, awake, Good concentration, dysarthria, right sided weakness, following commands  CHEST/LUNG: CTA b/l  HEART: Regular rate and rhythm  ABDOMEN: Soft, Nontender, Nondistended  EXTREMITIES:   No pitting LE edema  SKIN: warm, dry    Consultant(s) Notes Reviewed:  [x ] YES  [ ] NO  Care Discussed with Consultants/Other Providers [ x] YES  [ ] NO    MEDICATIONS  (STANDING):  aspirin enteric coated 81 milliGRAM(s) Oral daily  atorvastatin 80 milliGRAM(s) Oral at bedtime  clopidogrel Tablet      clopidogrel Tablet 75 milliGRAM(s) Oral daily  enoxaparin Injectable 40 milliGRAM(s) SubCutaneous every 24 hours  losartan 100 milliGRAM(s) Oral daily  metoprolol tartrate 100 milliGRAM(s) Oral two times a day  oxybutynin 5 milliGRAM(s) Oral two times a day  pantoprazole    Tablet 40 milliGRAM(s) Oral before breakfast  polyethylene glycol 3350 17 Gram(s) Oral daily  senna 1 Tablet(s) Oral daily    MEDICATIONS  (PRN):      LABS:                        13.3   5.79  )-----------( 183      ( 25 Jul 2024 06:08 )             39.5     07-25    141  |  107  |  12  ----------------------------<  91  3.8   |  21  |  0.8    Ca    10.3      25 Jul 2024 06:08  Mg     1.9     07-25    TPro  5.8<L>  /  Alb  3.9  /  TBili  1.1  /  DBili  x   /  AST  31  /  ALT  17  /  AlkPhos  85  07-25                              Care discussed with pt and family

## 2024-07-25 NOTE — DISCHARGE NOTE PROVIDER - HOSPITAL COURSE
Hospital course:  82-year-old right handed, no substance use, living with , retired; with PMHx of HTN, HLD, urinary incontinence, GERD; presented today for evaluation of stroke. Patient presented 4 days ago in the morning with dysarthria and difficulty swallow that wouldn't resolve. Initially she thought it was a cold, but finally today she visited her PCP Dr. Dominguez, who ordered the workup. Found to have acute infarct in left corona radiata/basal ganglia on MRI. CTA did not show evidence of major vascular stenosis, occlusion, or aneurysm. EP consulted, plan for ILR on friday.     During this hospital course, patient had a cerebral infarction in left corona radiata/basal ganglia on MRI   Etiology likely small vessel disease vs ESUS.  [x]etiology workup still in progress    Patient had the following workup done in house:  CT HEAD (7/23/2024): No evidence of acute intracranial hemorrhage, mass, or midline shift. Chronic microvascular changes.  CTA (7/23/2024): Negative CTA of the head and neck. No evidence of major vascular stenosis, occlusion, or aneurysm.  MR Head Non Contrast: left corona radiata/basal ganglia infarct   [x]echo:     1. Normal left ventricular internal cavity size.   2. Normal global left ventricular systolic function.   3. LV Ejection Fraction by Ayala's Method with a biplane EF of 68 %.   4. Normal right ventricular size and function.   5. Mild aortic regurgitation.   6. Dilatation of the ascending aorta.   7. Color flow doppler and intravenous injection of agitated saline   demonstrates the presence of an intact intra atrial septum.      Physical exam at discharge:  Physical exam:  General: No acute distress, awake and alert    Neurologic:  -Mental status: Awake, alert, oriented to person, place, and time. Speech is slightly aphasic. No dysarthric. Intact naming, repetition, and comprehension. Recent and remote memory intact. Follows commands. Attention/concentration intact. Fund of knowledge appropriate.  -Cranial nerves:   II: Visual fields are full to confrontation.  III, IV, VI: Extraocular movements are intact without nystagmus. Pupils equally round and reactive to light BL  V:  Facial sensation V1-V3 equal and intact   VII: Face is symmetric with normal eye closure and smile  VIII: Hearing is bilaterally intact to finger rub  IX, X: Uvula is midline and soft palate rises symmetrically  XI: Head turning and shoulder shrug are intact.  XII: Tongue protrudes midline  Motor: Normal bulk and tone. No pronator drift. Strength right proximal upper extremity 4/5, right distal upper extremity 3/5. LUE 4/5 bilaterally proximal and distal.  Bilateral lower extremities 4/5.  Rapid alternating movements intact and symmetric  Sensation: Intact to light touch bilaterally. No neglect or extinction on double simultaneous testing.  Coordination: No dysmetria on finger-to-nose and heel-to-shin bilaterally  Reflexes: Downgoing toes bilaterally   Gait: Deferred    NIHSS: 1 (aphasia)    New medications on discharge:  Labs to be followed up:  Imaging to be done as outpatient:  Further outpatient workup:   Hospital course:  83-year-old right handed, no substance use, living with , retired; with PMHx of HTN, HLD, urinary incontinence, GERD; presented today for evaluation of stroke. Patient presented 4 days ago in the morning with dysarthria and difficulty swallow that wouldn't resolve. Initially she thought it was a cold, but finally today she visited her PCP Dr. Dominguez, who ordered the workup. Found to have acute infarct in left corona radiata/basal ganglia on MRI. CTA did not show evidence of major vascular stenosis, occlusion, or aneurysm. EP consulted, Pt and family refused ILR placement. Risk and benefits discussed at length. Patient would still like to hold off. Plan to continue DAPT x 21 days then ASA. High intensity statin.    During this hospital course, patient had a cerebral infarction in left corona radiata/basal ganglia on MRI   Etiology likely small vessel disease vs ESUS.  [x]etiology workup still in progress    Patient had the following workup done in house:  CT HEAD (7/23/2024): No evidence of acute intracranial hemorrhage, mass, or midline shift. Chronic microvascular changes.  CTA (7/23/2024): Negative CTA of the head and neck. No evidence of major vascular stenosis, occlusion, or aneurysm.  MR Head Non Contrast: left corona radiata/basal ganglia infarct   [x]echo:     1. Normal left ventricular internal cavity size.   2. Normal global left ventricular systolic function.   3. LV Ejection Fraction by Ayala's Method with a biplane EF of 68 %.   4. Normal right ventricular size and function.   5. Mild aortic regurgitation.   6. Dilatation of the ascending aorta.   7. Color flow doppler and intravenous injection of agitated saline   demonstrates the presence of an intact intra atrial septum.      Physical exam at discharge:  General: No acute distress, awake and alert    Neurologic:  -Mental status: Awake, alert, oriented to person, place, and time. Speech is slightly aphasic. No dysarthric. Intact naming, repetition, and comprehension. Recent and remote memory intact. Follows commands. Attention/concentration intact. Fund of knowledge appropriate.  -Cranial nerves:   II: Visual fields are full to confrontation.  III, IV, VI: Extraocular movements are intact without nystagmus. Pupils equally round and reactive to light BL  V:  Facial sensation V1-V3 equal and intact   VII: Face is symmetric with normal eye closure and smile  VIII: Hearing is bilaterally intact to finger rub  IX, X: Uvula is midline and soft palate rises symmetrically  XI: Head turning and shoulder shrug are intact.  XII: Tongue protrudes midline  Motor: Normal bulk and tone. No pronator drift. Strength right proximal upper extremity 4/5, right distal upper extremity 3/5. LUE 4/5 bilaterally proximal and distal.  Bilateral lower extremities 4/5.  Rapid alternating movements intact and symmetric  Sensation: Intact to light touch bilaterally. No neglect or extinction on double simultaneous testing.  Coordination: No dysmetria on finger-to-nose and heel-to-shin bilaterally  Reflexes: Downgoing toes bilaterally   Gait: Deferred    NIHSS: 1 (aphasia)    New medications on discharge: Aspirin 81 mg, plavix 75 mg, atorvastatin   Further outpatient workup: F/u outpatient stroke    Hospital course:  83-year-old right handed, no substance use, living with , retired; with PMHx of HTN, HLD, urinary incontinence, GERD; presented today for evaluation of stroke. Patient presented 4 days ago in the morning with dysarthria and difficulty swallow that wouldn't resolve. Initially she thought it was a cold, but finally today she visited her PCP Dr. Dominguez, who ordered the workup. Found to have acute infarct in left corona radiata/basal ganglia on MRI. CTA did not show evidence of major vascular stenosis, occlusion, or aneurysm. EP consulted, Pt and family refused ILR placement. Risk and benefits discussed at length. Patient would still like to hold off. Plan to continue DAPT x 21 days then ASA. High intensity statin.    During this hospital course, patient had a cerebral infarction in left corona radiata/basal ganglia on MRI   Etiology likely small vessel disease vs ESUS.  [x]etiology workup still in progress    Patient had the following workup done in house:  CT HEAD (7/23/2024): No evidence of acute intracranial hemorrhage, mass, or midline shift. Chronic microvascular changes.  CTA (7/23/2024): Negative CTA of the head and neck. No evidence of major vascular stenosis, occlusion, or aneurysm.  MR Head Non Contrast: left corona radiata/basal ganglia infarct   [x]echo:     1. Normal left ventricular internal cavity size.   2. Normal global left ventricular systolic function.   3. LV Ejection Fraction by Ayala's Method with a biplane EF of 68 %.   4. Normal right ventricular size and function.   5. Mild aortic regurgitation.   6. Dilatation of the ascending aorta.   7. Color flow doppler and intravenous injection of agitated saline   demonstrates the presence of an intact intra atrial septum.      Physical exam at discharge:  Neurologic:  Mental status: Awake, alert and oriented x3.  Recent and remote memory intact.  Naming, repetition and comprehension intact.  Attention/concentration intact.  No dysarthria, no aphasia.  Fund of knowledge appropriate.    Cranial nerves: Pupils equally round and reactive to light, visual fields full, no nystagmus, extraocular muscles intact, V1 through V3 intact bilaterally and symmetric, face symmetric, hearing intact to finger rub, palate elevation symmetric, tongue was midline.  Motor:  MRC grading 5/5 b/l UE/LE.   strength 5/5.  Normal tone and bulk.  No abnormal movements.    Sensation: Intact to light touch, proprioception, and pinprick.   Coordination: No dysmetria on finger-to-nose and heel-to-shin.  No dysdiadokinesia.  Reflexes: 2+ in bilateral UE/LE, downgoing toes bilaterally. (-) Degroot.  Gait: Narrow and steady. No ataxia.  Romberg negative  NIHSS: 2 (age; aphasia)    New medications on discharge: Aspirin 81 mg, plavix 75 mg, atorvastatin 80mg  Further outpatient workup: F/u outpatient stroke    Hospital course:  83-year-old right handed, no substance use, living with , retired; with PMHx of HTN, HLD, urinary incontinence, GERD; presented today for evaluation of stroke. Patient presented 4 days ago in the morning with dysarthria and difficulty swallow that wouldn't resolve. Initially she thought it was a cold, but finally today she visited her PCP Dr. Dominguez, who ordered the workup. Found to have acute infarct in left corona radiata/basal ganglia on MRI. CTA did not show evidence of major vascular stenosis, occlusion, or aneurysm. EP consulted, Pt and family refused ILR placement. Risk and benefits discussed at length. Patient would still like to hold off. Plan to continue DAPT x 21 days then ASA. High intensity statin.    During this hospital course, patient had a cerebral infarction in left corona radiata/basal ganglia on MRI   Etiology likely small vessel disease vs ESUS.  [x]etiology workup still in progress    Patient had the following workup done in house:  CT HEAD (2024): No evidence of acute intracranial hemorrhage, mass, or midline shift. Chronic microvascular changes.  CTA (2024): Negative CTA of the head and neck. No evidence of major vascular stenosis, occlusion, or aneurysm.  MR Head Non Contrast: left corona radiata/basal ganglia infarct   [x]echo:     1. Normal left ventricular internal cavity size.   2. Normal global left ventricular systolic function.   3. LV Ejection Fraction by Ayala's Method with a biplane EF of 68 %.   4. Normal right ventricular size and function.   5. Mild aortic regurgitation.   6. Dilatation of the ascending aorta.   7. Color flow doppler and intravenous injection of agitated saline   demonstrates the presence of an intact intra atrial septum.      Physical exam at discharge:  Neurologic:  Mental status: Awake, alert and oriented x3.  Recent and remote memory intact.  Naming, repetition and comprehension intact.  Attention/concentration intact.  No dysarthria, no aphasia.  Fund of knowledge appropriate.    Cranial nerves: Pupils equally round and reactive to light, visual fields full, no nystagmus, extraocular muscles intact, V1 through V3 intact bilaterally and symmetric, face symmetric, hearing intact to finger rub, palate elevation symmetric, tongue was midline.  Motor:  MRC grading 5/5 b/l UE/LE.   strength 5/5.  Normal tone and bulk.  No abnormal movements.    Sensation: Intact to light touch, proprioception, and pinprick.   Coordination: No dysmetria on finger-to-nose and heel-to-shin.  No dysdiadokinesia.  Reflexes: 2+ in bilateral UE/LE, downgoing toes bilaterally. (-) Degroot.  Gait: Narrow and steady. No ataxia.  Romberg negative  NIHSS: 2 (age; aphasia)    New medications on discharge: Aspirin 81 mg, plavix 75 mg, atorvastatin 80mg  Further outpatient workup: F/u outpatient stroke       Stroke attending attestation:  Pt is a 84 yo F with PMhx of HTN, HLD, GERD, who presented with stroke on MRI brain from PCP. MRI was obtained for 1 week h/o speech difficulty and mild dysphagia.     Impr: acute/subacute ischemic stroke in left BG  CTA head/neck without significant flow limiting stenosis  [id number] with extensive chronic ischemic white matter disease  Etiology:  vs ESUS  PTA: ASA-> DAPT x 21 days then ASA. High intensity statin  TTE with EF 68%, -PFO. pt refusing ILR at this time.   D/c home with outpt therapy. F/u in stroke clinic    Time spent: 40 min  Reason for time spent: Review of imaging and chart; obtaining history; examination of pt; discussion and coordination of care.

## 2024-07-25 NOTE — PROGRESS NOTE ADULT - ASSESSMENT
83 y/o right handed woman with PMH of HTN, hyperlipidemia, chronic LE edema, urinary incontinence and GERD presented to the ED on 7/23 for evaluation of stroke. Per family, she developed dysarthria and right sided weakness and numbness about 4 days prior to admission but refused to seek medical attention. When she finally saw her PCP Dr. Jonathan Hurley, he suspected a stroke and sent her for a CT scan and then she had a MRI which showed acute infarct in left corona radiata/basal ganglia. CTA did not show evidence of major vascular stenosis, occlusion, or aneurysm.  Home meds reviewed at bedside    1. Acute stroke of left corona radiata/basal ganglia  dysarthria, right sided weakness  on ASA/Plavix and high intensity statin  management per neurology  possible etiology small vessel   neuro checks  Goal -160  speech and swallow eval appreciated ->Regular diet w/thin liquids   EKG reviewed - NSR  LDL 84  A1c 6  EP consulted for loop recorder - to be implanted on Friday  ECHO report reviewed: EF 68%, mild AI  continue PT/OT  rehab consult: inpt rehab candidate - awaiting bed availability    2. HTN - on metoprolol and ARB    3. Hyperlipidemia on high intensity statin    4. Urinary incontinence  home med: Myrbetriq and tolterodine  on oxybutynin 5 mg BID here    5. GERD on PPI    6. DVT Prophylaxis: Lovenox       full code  will follow with you - contact via Teams for questions/concerns

## 2024-07-25 NOTE — DISCHARGE NOTE PROVIDER - NSDCCPCAREPLAN_GEN_ALL_CORE_FT
PRINCIPAL DISCHARGE DIAGNOSIS  Diagnosis: Slurred speech  Assessment and Plan of Treatment: During this hospital admission, you had an ischemic stroke. During an ischemic stroke, blood stops flowing to part of your brain because of a blockage in the blood vessel. This can damage areas in the brain that control other parts of the body.  Please take your aspirin and plavix for blood thinning and Atorvastatin for cholesterol medication/blood vessel protection as prescribed to prevent further strokes. Do not skip doses and do not run low on your medication. If you run low on your medication, please contact your doctor.  You will follow up outpatient with the stroke physicianas scheduled.  Doing your regular tasks may be difficult after you've had a stroke, but you can learn new ways to manage your daily activities. In fact, doing daily activities may help you to regain muscle strength. Be patient, give yourself time to adjust, and appreciate the progress you make. For example, when showering or bathing, test the water temperature with a hand or foot that was not affected by the stroke, use grab bars, a shower seat, a hand-held showerhead, etc. It is normal to feel fatigue after a stroke, while some days may be worse than others, you will continue to improve.  Call 911 right away if you have any of the following symptoms of another stroke:  B: Balance: Sudden: Dizziness, loss of balance, or a sense of falling, difficulty with coordinating movement  E: Eyes: Sudden double vision or trouble seeing in one or both eyes  F: Face: Sudden uneven face  A: Arms (Legs): Sudden weakness, tingling, or loss of feeling on one side of your face or body  S: Speech: Sudden trouble talking or slurred speech, sudden difficulty understanding others  T: Time: Please call 911 right away and go to the emergency room  •Sudden, severe headache  •Blackouts or seizures

## 2024-07-25 NOTE — PROGRESS NOTE ADULT - ASSESSMENT
82-year-old right handed, no substance use, living with , retired; with PMHx of HTN, HLD, urinary incontinence, GERD; presented today for evaluation of stroke. Patient presented 4 days ago in the morning with dysarthria and difficulty swallow that wouldn't resolve. Initially she thought it was a cold, but finally today she visited her PCP Dr. Dominguez, who ordered the workup. Found to have acute infarct in left corona radiata/basal ganglia on MRI. CTA did not show evidence of major vascular stenosis, occlusion, or aneurysm. EP consulted, plan for ILR on friday.     #Cerebral infarction in left corona radiata/basal ganglia   Etiology likely small vessel disease vs ESUS. LDL 84, A1c 6  S/P plavix 300 mg x1  - continue aspirin 81mg and plavix 75mg daily for 21 days   - continue atorvastatin 80mg daily  - EP consulted, plan for ILR on friday   - q8hr stroke neuro checks and vitals  - Goal -160  - PT/OT/Physiatry-good candidate for 4a    #HTN  - Goal -180  - continue home blood pressure medication metoprolol and irbersartan  - TTE     #HLD  Hold home med pravastatin  - C/w high dose statin as above in CVA  - LDL results: 84    #Urinary incontinence  On home med Myrbetriq and tolterodine  Hold tolterodine for now  - Continue Myrbetriq 50 mg QD (needs to be verified)  - C/w oxybutynin 5 mg BID    #Misc:   DVT Prophylaxis: Lovenox sq  GI prophylaxis for GERD: Pantoprazole 40 mg QD  Diet: regular  Dispo: Tele  82-year-old right handed, no substance use, living with , retired; with PMHx of HTN, HLD, urinary incontinence, GERD; presented for evaluation of stroke. Patient presented with dysarthria and difficulty swallow that wouldn't resolve. Initially she thought it was a cold, but finally today she visited her PCP Dr. Dominguez, who ordered the workup. Found to have acute infarct in left corona radiata/basal ganglia on MRI. CTA did not show evidence of major vascular stenosis, occlusion, or aneurysm. Etiology likely small vessel disease vs ESUS. EP consulted, plan for ILR on friday.     #Cerebral infarction in left corona radiata/basal ganglia   Etiology likely small vessel disease vs ESUS. LDL 84, A1c 6  S/P plavix 300 mg x1  - continue aspirin 81mg and plavix 75mg daily for 21 days   - continue atorvastatin 80mg daily  - EP consulted, plan for ILR on friday (7/26)   - q8hr stroke neuro checks and vitals  - Goal -160  - PT/OT/Physiatry-good candidate for 4a    #HTN  - Goal -180  - continue home blood pressure medication metoprolol and irbersartan    #HLD  Hold home med pravastatin  - C/w high dose statin  - LDL results: 84    #Urinary incontinence  On home med Myrbetriq and tolterodine  Hold tolterodine for now  - C/w oxybutynin 5 mg BID    #Misc:   DVT Prophylaxis: Lovenox sq  GI prophylaxis for GERD: Pantoprazole 40 mg QD  Diet: regular  Dispo: Pending Rehab

## 2024-07-25 NOTE — DISCHARGE NOTE PROVIDER - CARE PROVIDER_API CALL
Rochelle Greenberg  Neurology  [address] [place] [id number]  Phone: [phone]  Fax: [phone]  Follow Up Time: 2 weeks

## 2024-07-25 NOTE — DISCHARGE NOTE PROVIDER - NSDCMRMEDTOKEN_GEN_ALL_CORE_FT
Ecotrin Adult Low Strength 81 mg oral delayed release tablet: 1 tab(s) orally once a day  irbesartan 300 mg oral tablet: 1 tab(s) orally once a day  metoprolol tartrate 100 mg oral tablet: 1 tab(s) orally 2 times a day  Myrbetriq 50 mg oral tablet, extended release: 1 tab(s) orally once a day  pantoprazole 40 mg oral delayed release tablet: 1 tab(s) orally once a day  pravastatin 20 mg oral tablet: 1 tab(s) orally once a day (at bedtime)  tolterodine 4 mg oral capsule, extended release: 1 cap(s) orally once a day   atorvastatin 80 mg oral tablet: 1 tab(s) orally once a day (at bedtime)  clopidogrel 75 mg oral tablet: 1 tab(s) orally once a day  Ecotrin Adult Low Strength 81 mg oral delayed release tablet: 1 tab(s) orally once a day  irbesartan 300 mg oral tablet: 1 tab(s) orally once a day  metoprolol tartrate 100 mg oral tablet: 1 tab(s) orally 2 times a day  Myrbetriq 50 mg oral tablet, extended release: 1 tab(s) orally once a day  pantoprazole 40 mg oral delayed release tablet: 1 tab(s) orally once a day  tolterodine 4 mg oral capsule, extended release: 1 cap(s) orally once a day

## 2024-07-25 NOTE — PROGRESS NOTE ADULT - SUBJECTIVE AND OBJECTIVE BOX
Neurology Stroke Progress Note    INTERVAL HPI/OVERNIGHT EVENTS:  Patient seen and examined  number ______ used.    MEDICATIONS  (STANDING):  aspirin enteric coated 81 milliGRAM(s) Oral daily  atorvastatin 80 milliGRAM(s) Oral at bedtime  clopidogrel Tablet      clopidogrel Tablet 75 milliGRAM(s) Oral daily  enoxaparin Injectable 40 milliGRAM(s) SubCutaneous every 24 hours  losartan 100 milliGRAM(s) Oral daily  metoprolol tartrate 100 milliGRAM(s) Oral two times a day  oxybutynin 5 milliGRAM(s) Oral two times a day  pantoprazole    Tablet 40 milliGRAM(s) Oral before breakfast    MEDICATIONS  (PRN):    Allergies    No Known Allergies    Intolerances      Vital Signs Last 24 Hrs  T(C): 36.1 (25 Jul 2024 04:50), Max: 36.8 (24 Jul 2024 20:01)  T(F): 97 (25 Jul 2024 04:50), Max: 98.2 (24 Jul 2024 20:01)  HR: 54 (25 Jul 2024 04:50) (54 - 80)  BP: 126/54 (25 Jul 2024 04:50) (126/54 - 163/66)  BP(mean): --  RR: 18 (25 Jul 2024 04:50) (18 - 18)  SpO2: 97% (25 Jul 2024 04:50) (97% - 99%)    Parameters below as of 25 Jul 2024 04:50  Patient On (Oxygen Delivery Method): room air        Physical exam:  General: No acute distress, awake and alert  Eyes: Anicteric sclerae, moist conjunctivae, see below for CNs  Neck: trachea midline, FROM, supple, no thyromegaly or lymphadenopathy  Cardiovascular: Regular rate and rhythm, no murmurs, rubs, or gallops. No carotid bruits.   Pulmonary: Anterior breath sounds clear bilaterally, no crackles or wheezing. No use of accessory muscles  GI: Abdomen soft, non-distended, non-tender  Extremities: Radial and DP pulses +2, no edema    Neurologic:  -Mental status: Awake, alert, oriented to person, place, and time. Speech is fluent with intact naming, repetition, and comprehension, no dysarthria. Recent and remote memory intact. Follows commands. Attention/concentration intact. Fund of knowledge appropriate.  -Cranial nerves:   II: Visual fields are full to confrontation.  III, IV, VI: Extraocular movements are intact without nystagmus. Pupils equally round and reactive to light  V:  Facial sensation V1-V3 equal and intact   VII: Face is symmetric with normal eye closure and smile  VIII: Hearing is bilaterally intact to finger rub  IX, X: Uvula is midline and soft palate rises symmetrically  XI: Head turning and shoulder shrug are intact.  XII: Tongue protrudes midline  Motor: Normal bulk and tone. No pronator drift. Strength bilateral upper extremity 5/5, bilateral lower extremities 5/5.  Rapid alternating movements intact and symmetric  Sensation: Intact to light touch bilaterally. No neglect or extinction on double simultaneous testing.  Coordination: No dysmetria on finger-to-nose and heel-to-shin bilaterally  Reflexes: Downgoing toes bilaterally   Gait: Narrow gait and steady    LABS:                        13.0   7.07  )-----------( 201      ( 23 Jul 2024 20:58 )             38.2     07-24    144  |  107  |  12  ----------------------------<  101<H>  3.9   |  20  |  0.8    Ca    10.6<H>      24 Jul 2024 04:30  Mg     1.6     07-23    TPro  5.5<L>  /  Alb  3.9  /  TBili  1.2  /  DBili  0.3  /  AST  23  /  ALT  16  /  AlkPhos  92  07-24      Urinalysis Basic - ( 24 Jul 2024 04:30 )    Color: x / Appearance: x / SG: x / pH: x  Gluc: 101 mg/dL / Ketone: x  / Bili: x / Urobili: x   Blood: x / Protein: x / Nitrite: x   Leuk Esterase: x / RBC: x / WBC x   Sq Epi: x / Non Sq Epi: x / Bacteria: x        RADIOLOGY & ADDITIONAL TESTS:     Neurology Stroke Progress Note    INTERVAL HPI/OVERNIGHT EVENTS:  Patient seen and examined. States she is feeling fine, her speech is starting to sound better. Denies dizziness, headache, difficulty swallowing, new focal weakness or sensory changes. No overnight acute events.     MEDICATIONS  (STANDING):  aspirin enteric coated 81 milliGRAM(s) Oral daily  atorvastatin 80 milliGRAM(s) Oral at bedtime  clopidogrel Tablet      clopidogrel Tablet 75 milliGRAM(s) Oral daily  enoxaparin Injectable 40 milliGRAM(s) SubCutaneous every 24 hours  losartan 100 milliGRAM(s) Oral daily  metoprolol tartrate 100 milliGRAM(s) Oral two times a day  oxybutynin 5 milliGRAM(s) Oral two times a day  pantoprazole    Tablet 40 milliGRAM(s) Oral before breakfast    MEDICATIONS  (PRN):    Allergies    No Known Allergies    Intolerances      Vital Signs Last 24 Hrs  T(C): 36.1 (25 Jul 2024 04:50), Max: 36.8 (24 Jul 2024 20:01)  T(F): 97 (25 Jul 2024 04:50), Max: 98.2 (24 Jul 2024 20:01)  HR: 54 (25 Jul 2024 04:50) (54 - 80)  BP: 126/54 (25 Jul 2024 04:50) (126/54 - 163/66)  BP(mean): --  RR: 18 (25 Jul 2024 04:50) (18 - 18)  SpO2: 97% (25 Jul 2024 04:50) (97% - 99%)    Parameters below as of 25 Jul 2024 04:50  Patient On (Oxygen Delivery Method): room air        Physical exam:  General: No acute distress, awake and alert    Neurologic:  -Mental status: Awake, alert, oriented to person, place, and time. Speech is aphasic. No dysarthria. Intact naming, repetition, and comprehension. Recent and remote memory intact. Follows commands. Attention/concentration intact. Fund of knowledge appropriate.  -Cranial nerves:   II: Visual fields are full to confrontation.  III, IV, VI: Extraocular movements are intact without nystagmus. Pupils equally round and reactive to light BL  V:  Facial sensation V1-V3 equal and intact   VII: Face is symmetric with normal eye closure and smile  VIII: Hearing is bilaterally intact to finger rub  IX, X: Uvula is midline and soft palate rises symmetrically  XI: Head turning and shoulder shrug are intact.  XII: Tongue protrudes midline  Motor: Normal bulk and tone. No pronator drift. Strength right proximal upper extremity 4/5, right distal upper extremity 3/5. LUE 4/5 bilaterally proximal and distal.  Bilateral lower extremities 4/5.  Rapid alternating movements intact and symmetric  Sensation: Intact to light touch bilaterally. No neglect or extinction on double simultaneous testing.  Coordination: No dysmetria on finger-to-nose and heel-to-shin bilaterally  Reflexes: Downgoing toes bilaterally   Gait: Deferred    NIHSS: 1 (aphasia)      LABS:                        13.0   7.07  )-----------( 201      ( 23 Jul 2024 20:58 )             38.2     07-24    144  |  107  |  12  ----------------------------<  101<H>  3.9   |  20  |  0.8    Ca    10.6<H>      24 Jul 2024 04:30  Mg     1.6     07-23    TPro  5.5<L>  /  Alb  3.9  /  TBili  1.2  /  DBili  0.3  /  AST  23  /  ALT  16  /  AlkPhos  92  07-24      Urinalysis Basic - ( 24 Jul 2024 04:30 )    Color: x / Appearance: x / SG: x / pH: x  Gluc: 101 mg/dL / Ketone: x  / Bili: x / Urobili: x   Blood: x / Protein: x / Nitrite: x   Leuk Esterase: x / RBC: x / WBC x   Sq Epi: x / Non Sq Epi: x / Bacteria: x        RADIOLOGY & ADDITIONAL TESTS:    CT HEAD (7/23/2024): No evidence of acute intracranial hemorrhage, mass, or midline shift. Chronic microvascular changes.    CTA (7/23/2024): Negative CTA of the head and neck. No evidence of major vascular stenosis, occlusion, or aneurysm.    TTE (7/24/2024):     1. Normal left ventricular internal cavity size.   2. Normal global left ventricular systolic function.   3. LV Ejection Fraction by Ayala's Method with a biplane EF of 68 %.   4. Normal right ventricular size and function.   5. Mild aortic regurgitation.   6. Dilatation of the ascending aorta.   7. Color flow doppler and intravenous injection of agitated saline   demonstrates the presence of an intact intra atrial septum.

## 2024-07-26 LAB
ALBUMIN SERPL ELPH-MCNC: 3.8 G/DL — SIGNIFICANT CHANGE UP (ref 3.5–5.2)
ALP SERPL-CCNC: 101 U/L — SIGNIFICANT CHANGE UP (ref 30–115)
ALT FLD-CCNC: 18 U/L — SIGNIFICANT CHANGE UP (ref 0–41)
ANION GAP SERPL CALC-SCNC: 9 MMOL/L — SIGNIFICANT CHANGE UP (ref 7–14)
AST SERPL-CCNC: 26 U/L — SIGNIFICANT CHANGE UP (ref 0–41)
BASOPHILS # BLD AUTO: 0.03 K/UL — SIGNIFICANT CHANGE UP (ref 0–0.2)
BASOPHILS NFR BLD AUTO: 0.5 % — SIGNIFICANT CHANGE UP (ref 0–1)
BILIRUB SERPL-MCNC: 1.2 MG/DL — SIGNIFICANT CHANGE UP (ref 0.2–1.2)
BUN SERPL-MCNC: 14 MG/DL — SIGNIFICANT CHANGE UP (ref 10–20)
CALCIUM SERPL-MCNC: 10.5 MG/DL — SIGNIFICANT CHANGE UP (ref 8.4–10.5)
CHLORIDE SERPL-SCNC: 108 MMOL/L — SIGNIFICANT CHANGE UP (ref 98–110)
CO2 SERPL-SCNC: 25 MMOL/L — SIGNIFICANT CHANGE UP (ref 17–32)
CREAT SERPL-MCNC: 1 MG/DL — SIGNIFICANT CHANGE UP (ref 0.7–1.5)
EGFR: 56 ML/MIN/1.73M2 — LOW
EOSINOPHIL # BLD AUTO: 0.12 K/UL — SIGNIFICANT CHANGE UP (ref 0–0.7)
EOSINOPHIL NFR BLD AUTO: 2 % — SIGNIFICANT CHANGE UP (ref 0–8)
GLUCOSE SERPL-MCNC: 116 MG/DL — HIGH (ref 70–99)
HCT VFR BLD CALC: 38.7 % — SIGNIFICANT CHANGE UP (ref 37–47)
HGB BLD-MCNC: 12.8 G/DL — SIGNIFICANT CHANGE UP (ref 12–16)
IMM GRANULOCYTES NFR BLD AUTO: 0.7 % — HIGH (ref 0.1–0.3)
LYMPHOCYTES # BLD AUTO: 1.31 K/UL — SIGNIFICANT CHANGE UP (ref 1.2–3.4)
LYMPHOCYTES # BLD AUTO: 22.3 % — SIGNIFICANT CHANGE UP (ref 20.5–51.1)
MAGNESIUM SERPL-MCNC: 1.8 MG/DL — SIGNIFICANT CHANGE UP (ref 1.8–2.4)
MCHC RBC-ENTMCNC: 31.4 PG — HIGH (ref 27–31)
MCHC RBC-ENTMCNC: 33.1 G/DL — SIGNIFICANT CHANGE UP (ref 32–37)
MCV RBC AUTO: 95.1 FL — SIGNIFICANT CHANGE UP (ref 81–99)
MONOCYTES # BLD AUTO: 0.51 K/UL — SIGNIFICANT CHANGE UP (ref 0.1–0.6)
MONOCYTES NFR BLD AUTO: 8.7 % — SIGNIFICANT CHANGE UP (ref 1.7–9.3)
NEUTROPHILS # BLD AUTO: 3.87 K/UL — SIGNIFICANT CHANGE UP (ref 1.4–6.5)
NEUTROPHILS NFR BLD AUTO: 65.8 % — SIGNIFICANT CHANGE UP (ref 42.2–75.2)
NRBC # BLD: 0 /100 WBCS — SIGNIFICANT CHANGE UP (ref 0–0)
PHOSPHATE SERPL-MCNC: 2.8 MG/DL — SIGNIFICANT CHANGE UP (ref 2.1–4.9)
PLATELET # BLD AUTO: 193 K/UL — SIGNIFICANT CHANGE UP (ref 130–400)
PMV BLD: 9.4 FL — SIGNIFICANT CHANGE UP (ref 7.4–10.4)
POTASSIUM SERPL-MCNC: 3.9 MMOL/L — SIGNIFICANT CHANGE UP (ref 3.5–5)
POTASSIUM SERPL-SCNC: 3.9 MMOL/L — SIGNIFICANT CHANGE UP (ref 3.5–5)
PROT SERPL-MCNC: 5.6 G/DL — LOW (ref 6–8)
RBC # BLD: 4.07 M/UL — LOW (ref 4.2–5.4)
RBC # FLD: 13.2 % — SIGNIFICANT CHANGE UP (ref 11.5–14.5)
SODIUM SERPL-SCNC: 142 MMOL/L — SIGNIFICANT CHANGE UP (ref 135–146)
WBC # BLD: 5.88 K/UL — SIGNIFICANT CHANGE UP (ref 4.8–10.8)
WBC # FLD AUTO: 5.88 K/UL — SIGNIFICANT CHANGE UP (ref 4.8–10.8)

## 2024-07-26 PROCEDURE — 99231 SBSQ HOSP IP/OBS SF/LOW 25: CPT

## 2024-07-26 PROCEDURE — 99233 SBSQ HOSP IP/OBS HIGH 50: CPT

## 2024-07-26 RX ORDER — MAGNESIUM SULFATE 500 MG/ML
1 VIAL (ML) INJECTION ONCE
Refills: 0 | Status: COMPLETED | OUTPATIENT
Start: 2024-07-26 | End: 2024-07-26

## 2024-07-26 RX ADMIN — Medication 100 MILLIGRAM(S): at 06:21

## 2024-07-26 RX ADMIN — ENOXAPARIN SODIUM 40 MILLIGRAM(S): 120 INJECTION SUBCUTANEOUS at 23:26

## 2024-07-26 RX ADMIN — Medication 17 GRAM(S): at 11:31

## 2024-07-26 RX ADMIN — PANTOPRAZOLE SODIUM 40 MILLIGRAM(S): 20 TABLET, DELAYED RELEASE ORAL at 06:21

## 2024-07-26 RX ADMIN — ATORVASTATIN CALCIUM 80 MILLIGRAM(S): 40 TABLET, FILM COATED ORAL at 21:46

## 2024-07-26 RX ADMIN — Medication 81 MILLIGRAM(S): at 11:31

## 2024-07-26 RX ADMIN — Medication 100 MILLIGRAM(S): at 18:03

## 2024-07-26 RX ADMIN — LOSARTAN POTASSIUM 100 MILLIGRAM(S): 50 TABLET, FILM COATED ORAL at 06:20

## 2024-07-26 RX ADMIN — Medication 100 GRAM(S): at 11:30

## 2024-07-26 RX ADMIN — OXYBUTYNIN CHLORIDE 5 MILLIGRAM(S): 5 TABLET, FILM COATED, EXTENDED RELEASE ORAL at 06:20

## 2024-07-26 RX ADMIN — SENNOSIDES 1 TABLET(S): 8.6 TABLET ORAL at 11:31

## 2024-07-26 RX ADMIN — OXYBUTYNIN CHLORIDE 5 MILLIGRAM(S): 5 TABLET, FILM COATED, EXTENDED RELEASE ORAL at 18:03

## 2024-07-26 RX ADMIN — CLOPIDOGREL BISULFATE 75 MILLIGRAM(S): 75 TABLET, FILM COATED ORAL at 11:31

## 2024-07-26 NOTE — PROGRESS NOTE ADULT - ASSESSMENT
82-year-old right handed, no substance use, living with , retired; with PMHx of HTN, HLD, urinary incontinence, GERD; presented for evaluation of stroke. Patient presented with dysarthria and difficulty swallow that wouldn't resolve. Initially she thought it was a cold, but finally today she visited her PCP Dr. Dominguez, who ordered the workup. Found to have acute infarct in left corona radiata/basal ganglia on MRI. CTA did not show evidence of major vascular stenosis, occlusion, or aneurysm. Etiology likely small vessel disease vs ESUS. EP consulted, plan for ILR on friday.     #Cerebral infarction in left corona radiata/basal ganglia   Etiology likely small vessel disease vs ESUS. LDL 84, A1c 6  S/P plavix 300 mg x1  - continue aspirin 81mg and plavix 75mg daily for 21 days   - continue atorvastatin 80mg daily  - EP consulted, plan for ILR on friday (7/26)   - q8hr stroke neuro checks and vitals  - Goal -160  - PT/OT/Physiatry-good candidate for 4a    #HTN  - Goal -180  - continue home blood pressure medication metoprolol and irbersartan    #HLD  Hold home med pravastatin  - C/w high dose statin  - LDL results: 84    #Urinary incontinence  On home med Myrbetriq and tolterodine  Hold tolterodine for now  - C/w oxybutynin 5 mg BID    #Misc:   DVT Prophylaxis: Lovenox sq  GI prophylaxis for GERD: Pantoprazole 40 mg QD  Diet: regular  Dispo: Pending Rehab        83-year-old right handed, no substance use, living with , retired; with PMHx of HTN, HLD, urinary incontinence, GERD; presented for evaluation of stroke. Patient presented with dysarthria and difficulty swallow that wouldn't resolve. Initially she thought it was a cold, but finally today she visited her PCP Dr. Dominguez, who ordered the workup. Found to have acute infarct in left corona radiata/basal ganglia on MRI. CTA did not show evidence of major vascular stenosis, occlusion, or aneurysm. Etiology likely small vessel disease vs ESUS.    #Cerebral infarction in left corona radiata/basal ganglia   Etiology likely small vessel disease vs ESUS. LDL 84, A1c 6  S/P plavix 300 mg x1  - continue aspirin 81mg and plavix 75mg daily for 21 days   - continue atorvastatin 80mg daily  - EP consulted, Pt and family refused ILR placement. Risks and benefits discussed at length.   - q8hr stroke neuro checks and vitals  - Goal -160  - PT/OT/Physiatry- inpatient rehab    #HTN  - Goal -180  - continue home blood pressure medication metoprolol and irbersartan    #HLD  Hold home med pravastatin  - C/w high dose statin  - LDL results: 84    #Urinary incontinence  On home med Myrbetriq and tolterodine  Hold tolterodine for now  - C/w oxybutynin 5 mg BID    #Misc:   DVT Prophylaxis: Lovenox sq  GI prophylaxis for GERD: Pantoprazole 40 mg QD  Diet: regular  Dispo: inpatient rehab

## 2024-07-26 NOTE — PROGRESS NOTE ADULT - SUBJECTIVE AND OBJECTIVE BOX
Neurology Stroke Progress Note    INTERVAL HPI/OVERNIGHT EVENTS:  Patient seen and examined.  number ______ used.    MEDICATIONS  (STANDING):  aspirin enteric coated 81 milliGRAM(s) Oral daily  atorvastatin 80 milliGRAM(s) Oral at bedtime  clopidogrel Tablet 75 milliGRAM(s) Oral daily  clopidogrel Tablet      enoxaparin Injectable 40 milliGRAM(s) SubCutaneous every 24 hours  losartan 100 milliGRAM(s) Oral daily  metoprolol tartrate 100 milliGRAM(s) Oral two times a day  oxybutynin 5 milliGRAM(s) Oral two times a day  pantoprazole    Tablet 40 milliGRAM(s) Oral before breakfast  polyethylene glycol 3350 17 Gram(s) Oral daily  senna 1 Tablet(s) Oral daily    MEDICATIONS  (PRN):    Allergies    No Known Allergies    Intolerances      Vital Signs Last 24 Hrs  T(C): 36.7 (26 Jul 2024 04:53), Max: 36.7 (25 Jul 2024 13:03)  T(F): 98 (26 Jul 2024 04:53), Max: 98.1 (25 Jul 2024 13:03)  HR: 67 (26 Jul 2024 04:53) (59 - 67)  BP: 157/77 (26 Jul 2024 05:20) (156/78 - 173/87)  BP(mean): 103 (26 Jul 2024 05:20) (103 - 106)  RR: 18 (26 Jul 2024 04:53) (16 - 18)  SpO2: 98% (26 Jul 2024 04:53) (97% - 98%)    Parameters below as of 25 Jul 2024 20:41  Patient On (Oxygen Delivery Method): room air        Physical exam:  General: No acute distress, awake and alert    Neurologic:  -Mental status: Awake, alert, oriented to person, place, and time. Speech is aphasic. No dysarthria. Intact naming, repetition, and comprehension. Recent and remote memory intact. Follows commands. Attention/concentration intact. Fund of knowledge appropriate.  -Cranial nerves:   II: Visual fields are full to confrontation.  III, IV, VI: Extraocular movements are intact without nystagmus. Pupils equally round and reactive to light BL  V:  Facial sensation V1-V3 equal and intact   VII: Face is symmetric with normal eye closure and smile  VIII: Hearing is bilaterally intact to finger rub  IX, X: Uvula is midline and soft palate rises symmetrically  XI: Head turning and shoulder shrug are intact.  XII: Tongue protrudes midline  Motor: Normal bulk and tone. No pronator drift. Strength right proximal upper extremity 4/5, right distal upper extremity 3/5. LUE 4/5 bilaterally proximal and distal.  Bilateral lower extremities 4/5.  Rapid alternating movements intact and symmetric  Sensation: Intact to light touch bilaterally. No neglect or extinction on double simultaneous testing.  Coordination: No dysmetria on finger-to-nose and heel-to-shin bilaterally  Reflexes: Downgoing toes bilaterally   Gait: Deferred    NIHSS: 1 (aphasia)    LABS:                        13.3   5.79  )-----------( 183      ( 25 Jul 2024 06:08 )             39.5     07-25    141  |  107  |  12  ----------------------------<  91  3.8   |  21  |  0.8    Ca    10.3      25 Jul 2024 06:08  Mg     1.9     07-25    TPro  5.8<L>  /  Alb  3.9  /  TBili  1.1  /  DBili  x   /  AST  31  /  ALT  17  /  AlkPhos  85  07-25      Urinalysis Basic - ( 25 Jul 2024 06:08 )    Color: x / Appearance: x / SG: x / pH: x  Gluc: 91 mg/dL / Ketone: x  / Bili: x / Urobili: x   Blood: x / Protein: x / Nitrite: x   Leuk Esterase: x / RBC: x / WBC x   Sq Epi: x / Non Sq Epi: x / Bacteria: x        RADIOLOGY & ADDITIONAL TESTS:    CT HEAD (7/23/2024): No evidence of acute intracranial hemorrhage, mass, or midline shift. Chronic microvascular changes.    CTA (7/23/2024): Negative CTA of the head and neck. No evidence of major vascular stenosis, occlusion, or aneurysm.    TTE (7/24/2024):     1. Normal left ventricular internal cavity size.   2. Normal global left ventricular systolic function.   3. LV Ejection Fraction by Ayala's Method with a biplane EF of 68 %.   4. Normal right ventricular size and function.   5. Mild aortic regurgitation.   6. Dilatation of the ascending aorta.   7. Color flow doppler and intravenous injection of agitated saline   demonstrates the presence of an intact intra atrial septum.   Neurology Stroke Progress Note    INTERVAL HPI/OVERNIGHT EVENTS:  Patient seen and examined.     MEDICATIONS  (STANDING):  aspirin enteric coated 81 milliGRAM(s) Oral daily  atorvastatin 80 milliGRAM(s) Oral at bedtime  clopidogrel Tablet 75 milliGRAM(s) Oral daily  clopidogrel Tablet      enoxaparin Injectable 40 milliGRAM(s) SubCutaneous every 24 hours  losartan 100 milliGRAM(s) Oral daily  metoprolol tartrate 100 milliGRAM(s) Oral two times a day  oxybutynin 5 milliGRAM(s) Oral two times a day  pantoprazole    Tablet 40 milliGRAM(s) Oral before breakfast  polyethylene glycol 3350 17 Gram(s) Oral daily  senna 1 Tablet(s) Oral daily    MEDICATIONS  (PRN):    Allergies    No Known Allergies    Intolerances      Vital Signs Last 24 Hrs  T(C): 36.7 (26 Jul 2024 04:53), Max: 36.7 (25 Jul 2024 13:03)  T(F): 98 (26 Jul 2024 04:53), Max: 98.1 (25 Jul 2024 13:03)  HR: 67 (26 Jul 2024 04:53) (59 - 67)  BP: 157/77 (26 Jul 2024 05:20) (156/78 - 173/87)  BP(mean): 103 (26 Jul 2024 05:20) (103 - 106)  RR: 18 (26 Jul 2024 04:53) (16 - 18)  SpO2: 98% (26 Jul 2024 04:53) (97% - 98%)    Parameters below as of 25 Jul 2024 20:41  Patient On (Oxygen Delivery Method): room air        Physical exam:  General: No acute distress, awake and alert    Neurologic:  -Mental status: Awake, alert, oriented to person, place, and time. Speech is aphasic. No dysarthria. Intact naming, repetition, and comprehension. Recent and remote memory intact. Follows commands. Attention/concentration intact. Fund of knowledge appropriate.  -Cranial nerves:   II: Visual fields are full to confrontation.  III, IV, VI: Extraocular movements are intact without nystagmus. Pupils equally round and reactive to light BL  V:  Facial sensation V1-V3 equal and intact   VII: Face is symmetric with normal eye closure and smile  VIII: Hearing is bilaterally intact to finger rub  IX, X: Uvula is midline and soft palate rises symmetrically  XI: Head turning and shoulder shrug are intact.  XII: Tongue protrudes midline  Motor: Normal bulk and tone. No pronator drift. Strength right proximal upper extremity 4/5, right distal upper extremity 3/5. LUE 4/5 bilaterally proximal and distal.  Bilateral lower extremities 4/5.  Rapid alternating movements intact and symmetric  Sensation: Intact to light touch bilaterally. No neglect or extinction on double simultaneous testing.  Coordination: No dysmetria on finger-to-nose and heel-to-shin bilaterally  Reflexes: Downgoing toes bilaterally   Gait: Deferred    NIHSS: 1 (aphasia)    LABS:                        13.3   5.79  )-----------( 183      ( 25 Jul 2024 06:08 )             39.5     07-25    141  |  107  |  12  ----------------------------<  91  3.8   |  21  |  0.8    Ca    10.3      25 Jul 2024 06:08  Mg     1.9     07-25    TPro  5.8<L>  /  Alb  3.9  /  TBili  1.1  /  DBili  x   /  AST  31  /  ALT  17  /  AlkPhos  85  07-25      Urinalysis Basic - ( 25 Jul 2024 06:08 )    Color: x / Appearance: x / SG: x / pH: x  Gluc: 91 mg/dL / Ketone: x  / Bili: x / Urobili: x   Blood: x / Protein: x / Nitrite: x   Leuk Esterase: x / RBC: x / WBC x   Sq Epi: x / Non Sq Epi: x / Bacteria: x        RADIOLOGY & ADDITIONAL TESTS:    CT HEAD (7/23/2024): No evidence of acute intracranial hemorrhage, mass, or midline shift. Chronic microvascular changes.    CTA (7/23/2024): Negative CTA of the head and neck. No evidence of major vascular stenosis, occlusion, or aneurysm.    TTE (7/24/2024):     1. Normal left ventricular internal cavity size.   2. Normal global left ventricular systolic function.   3. LV Ejection Fraction by Ayala's Method with a biplane EF of 68 %.   4. Normal right ventricular size and function.   5. Mild aortic regurgitation.   6. Dilatation of the ascending aorta.   7. Color flow doppler and intravenous injection of agitated saline   demonstrates the presence of an intact intra atrial septum.   Neurology Stroke Progress Note    INTERVAL HPI/OVERNIGHT EVENTS:  Patient seen and examined. States she is feeling fine, no complaints currently. Pt is still unsure about whether she wants to get the ILR, would like to discuss with her family. Denies dizziness, headache, new focal weakness or sensory changes. No overnight acute events.     MEDICATIONS  (STANDING):  aspirin enteric coated 81 milliGRAM(s) Oral daily  atorvastatin 80 milliGRAM(s) Oral at bedtime  clopidogrel Tablet 75 milliGRAM(s) Oral daily  clopidogrel Tablet      enoxaparin Injectable 40 milliGRAM(s) SubCutaneous every 24 hours  losartan 100 milliGRAM(s) Oral daily  metoprolol tartrate 100 milliGRAM(s) Oral two times a day  oxybutynin 5 milliGRAM(s) Oral two times a day  pantoprazole    Tablet 40 milliGRAM(s) Oral before breakfast  polyethylene glycol 3350 17 Gram(s) Oral daily  senna 1 Tablet(s) Oral daily    MEDICATIONS  (PRN):    Allergies    No Known Allergies    Intolerances      Vital Signs Last 24 Hrs  T(C): 36.7 (26 Jul 2024 04:53), Max: 36.7 (25 Jul 2024 13:03)  T(F): 98 (26 Jul 2024 04:53), Max: 98.1 (25 Jul 2024 13:03)  HR: 67 (26 Jul 2024 04:53) (59 - 67)  BP: 157/77 (26 Jul 2024 05:20) (156/78 - 173/87)  BP(mean): 103 (26 Jul 2024 05:20) (103 - 106)  RR: 18 (26 Jul 2024 04:53) (16 - 18)  SpO2: 98% (26 Jul 2024 04:53) (97% - 98%)    Parameters below as of 25 Jul 2024 20:41  Patient On (Oxygen Delivery Method): room air        Physical exam:  General: No acute distress, awake and alert    Neurologic:  -Mental status: Awake, alert, oriented to person, place, and time. Speech is mildly aphasic. No dysarthria. Intact naming, repetition, and comprehension. Recent and remote memory intact. Follows commands. Attention/concentration intact. Fund of knowledge appropriate.  -Cranial nerves:   II: Visual fields are full to confrontation.  III, IV, VI: Extraocular movements are intact without nystagmus. Pupils equally round and reactive to light BL  V:  Facial sensation V1-V3 equal and intact   VII: Face is symmetric with normal eye closure and smile  VIII: Hearing is bilaterally intact to finger rub  IX, X: Uvula is midline and soft palate rises symmetrically  XI: Head turning and shoulder shrug are intact.  XII: Tongue protrudes midline  Motor: Normal bulk and tone. No pronator drift. Strength right proximal upper extremity 4/5, right distal upper extremity 3/5. LUE 4/5 bilaterally proximal and distal.  Bilateral lower extremities 4/5.  Rapid alternating movements intact and symmetric  Sensation: Intact to light touch bilaterally. No neglect or extinction on double simultaneous testing.  Coordination: No dysmetria on finger-to-nose and heel-to-shin bilaterally  Reflexes: Downgoing toes bilaterally   Gait: Deferred    NIHSS: 1 (aphasia)    LABS:                        13.3   5.79  )-----------( 183      ( 25 Jul 2024 06:08 )             39.5     07-25    141  |  107  |  12  ----------------------------<  91  3.8   |  21  |  0.8    Ca    10.3      25 Jul 2024 06:08  Mg     1.9     07-25    TPro  5.8<L>  /  Alb  3.9  /  TBili  1.1  /  DBili  x   /  AST  31  /  ALT  17  /  AlkPhos  85  07-25      Urinalysis Basic - ( 25 Jul 2024 06:08 )    Color: x / Appearance: x / SG: x / pH: x  Gluc: 91 mg/dL / Ketone: x  / Bili: x / Urobili: x   Blood: x / Protein: x / Nitrite: x   Leuk Esterase: x / RBC: x / WBC x   Sq Epi: x / Non Sq Epi: x / Bacteria: x        RADIOLOGY & ADDITIONAL TESTS:    CT HEAD (7/23/2024): No evidence of acute intracranial hemorrhage, mass, or midline shift. Chronic microvascular changes.    CTA (7/23/2024): Negative CTA of the head and neck. No evidence of major vascular stenosis, occlusion, or aneurysm.    TTE (7/24/2024):     1. Normal left ventricular internal cavity size.   2. Normal global left ventricular systolic function.   3. LV Ejection Fraction by Ayala's Method with a biplane EF of 68 %.   4. Normal right ventricular size and function.   5. Mild aortic regurgitation.   6. Dilatation of the ascending aorta.   7. Color flow doppler and intravenous injection of agitated saline   demonstrates the presence of an intact intra atrial septum.

## 2024-07-26 NOTE — PROGRESS NOTE ADULT - SUBJECTIVE AND OBJECTIVE BOX
BEV MORGAN  83y Female    INTERVAL HPI/OVERNIGHT EVENTS:    pt feels better   and son at bedside  ambulating with PT    T(F): 98 (07-26-24 @ 04:53), Max: 98.1 (07-25-24 @ 13:03)  HR: 67 (07-26-24 @ 04:53) (59 - 67)  BP: 157/77 (07-26-24 @ 05:20) (156/78 - 173/87)  RR: 18 (07-26-24 @ 04:53) (16 - 18)  SpO2: 98% (07-26-24 @ 04:53) (97% - 98%)  I&O's Summary    25 Jul 2024 07:01  -  26 Jul 2024 07:00  --------------------------------------------------------  IN: 0 mL / OUT: 1 mL / NET: -1 mL      PHYSICAL EXAM:  GENERAL: NAD  HEAD:  Normocephalic  EYES:  conjunctiva and sclera clear  ENMT: Moist mucous membranes  NERVOUS SYSTEM:  Alert, awake, Good concentration, mild dysarthria (seems improved), improved strength of right side  CHEST/LUNG: CTA b/l  HEART: Regular rate and rhythm  ABDOMEN: Soft, Nontender, Nondistended  EXTREMITIES:  decreased LE edema  SKIN: warm, dry    Consultant(s) Notes Reviewed:  [x ] YES  [ ] NO  Care Discussed with Consultants/Other Providers [ x] YES  [ ] NO    MEDICATIONS  (STANDING):  aspirin enteric coated 81 milliGRAM(s) Oral daily  atorvastatin 80 milliGRAM(s) Oral at bedtime  clopidogrel Tablet      clopidogrel Tablet 75 milliGRAM(s) Oral daily  enoxaparin Injectable 40 milliGRAM(s) SubCutaneous every 24 hours  losartan 100 milliGRAM(s) Oral daily  metoprolol tartrate 100 milliGRAM(s) Oral two times a day  oxybutynin 5 milliGRAM(s) Oral two times a day  pantoprazole    Tablet 40 milliGRAM(s) Oral before breakfast  polyethylene glycol 3350 17 Gram(s) Oral daily  senna 1 Tablet(s) Oral daily    MEDICATIONS  (PRN):      LABS:                        12.8   5.88  )-----------( 193      ( 26 Jul 2024 06:30 )             38.7     07-26    142  |  108  |  14  ----------------------------<  116<H>  3.9   |  25  |  1.0    Ca    10.5      26 Jul 2024 06:30  Phos  2.8     07-26  Mg     1.8     07-26    TPro  5.6<L>  /  Alb  3.8  /  TBili  1.2  /  DBili  x   /  AST  26  /  ALT  18  /  AlkPhos  101  07-26                          Case discussed with neurology team today    Care discussed with pt and family

## 2024-07-26 NOTE — PROGRESS NOTE ADULT - ASSESSMENT
81 y/o right handed woman with PMH of HTN, hyperlipidemia, chronic LE edema, urinary incontinence and GERD presented to the ED on 7/23 for evaluation of stroke. Per family, she developed dysarthria and right sided weakness and numbness about 4 days prior to admission but refused to seek medical attention. When she finally saw her PCP Dr. Jonathan Hurley, he suspected a stroke and sent her for a CT scan and then she had a MRI which showed acute infarct in left corona radiata/basal ganglia. CTA did not show evidence of major vascular stenosis, occlusion, or aneurysm.  Home meds reviewed at bedside.    1. Acute stroke of left corona radiata/basal ganglia  dysarthria, right sided weakness  on ASA/Plavix and high intensity statin  management per neurology  possible etiology small vessel   neuro checks  Goal -160  speech and swallow eval appreciated ->Regular diet w/thin liquids   EKG reviewed - NSR  LDL 84  A1c 6  EP consulted for loop recorder - pt/family refused at this time per neuro note  ECHO report reviewed: EF 68%, mild AI  continue PT/OT including stair training  rehab consult: inpt rehab candidate - awaiting bed availability    2. HTN - on metoprolol and ARB    3. Hyperlipidemia on high intensity statin    4. Urinary incontinence  home med: Myrbetriq and tolterodine  on oxybutynin 5 mg BID here    5. GERD on PPI    6. DVT Prophylaxis: Lovenox       full code  will follow with you - contact via Teams for questions/concerns

## 2024-07-27 PROCEDURE — 99232 SBSQ HOSP IP/OBS MODERATE 35: CPT

## 2024-07-27 RX ADMIN — OXYBUTYNIN CHLORIDE 5 MILLIGRAM(S): 5 TABLET, FILM COATED, EXTENDED RELEASE ORAL at 06:03

## 2024-07-27 RX ADMIN — LOSARTAN POTASSIUM 100 MILLIGRAM(S): 50 TABLET, FILM COATED ORAL at 06:03

## 2024-07-27 RX ADMIN — ATORVASTATIN CALCIUM 80 MILLIGRAM(S): 40 TABLET, FILM COATED ORAL at 21:50

## 2024-07-27 RX ADMIN — OXYBUTYNIN CHLORIDE 5 MILLIGRAM(S): 5 TABLET, FILM COATED, EXTENDED RELEASE ORAL at 17:35

## 2024-07-27 RX ADMIN — CLOPIDOGREL BISULFATE 75 MILLIGRAM(S): 75 TABLET, FILM COATED ORAL at 11:34

## 2024-07-27 RX ADMIN — ENOXAPARIN SODIUM 40 MILLIGRAM(S): 120 INJECTION SUBCUTANEOUS at 22:30

## 2024-07-27 RX ADMIN — PANTOPRAZOLE SODIUM 40 MILLIGRAM(S): 20 TABLET, DELAYED RELEASE ORAL at 06:04

## 2024-07-27 RX ADMIN — Medication 100 MILLIGRAM(S): at 17:35

## 2024-07-27 RX ADMIN — Medication 81 MILLIGRAM(S): at 11:34

## 2024-07-27 NOTE — PROGRESS NOTE ADULT - SUBJECTIVE AND OBJECTIVE BOX
BEV MORGAN  83y Female    INTERVAL HPI/OVERNIGHT EVENTS: Pt doing well, Family bedside.     T(F): 98 (07-26-24 @ 04:53), Max: 98.1 (07-25-24 @ 13:03)  HR: 67 (07-26-24 @ 04:53) (59 - 67)  BP: 157/77 (07-26-24 @ 05:20) (156/78 - 173/87)  RR: 18 (07-26-24 @ 04:53) (16 - 18)  SpO2: 98% (07-26-24 @ 04:53) (97% - 98%)  I&O's Summary    25 Jul 2024 07:01  -  26 Jul 2024 07:00  --------------------------------------------------------  IN: 0 mL / OUT: 1 mL / NET: -1 mL      PHYSICAL EXAM:  GENERAL: NAD  HEAD:  Normocephalic  EYES:  conjunctiva and sclera clear  ENMT: Moist mucous membranes  NERVOUS SYSTEM:  Alert, awake, Good concentration, mild dysarthria (seems improved), improved strength of right side  CHEST/LUNG: CTA b/l  HEART: Regular rate and rhythm  ABDOMEN: Soft, Nontender, Nondistended  EXTREMITIES:  decreased LE edema  SKIN: warm, dry    Consultant(s) Notes Reviewed:  [x ] YES  [ ] NO  Care Discussed with Consultants/Other Providers [ x] YES  [ ] NO    MEDICATIONS  (STANDING):  aspirin enteric coated 81 milliGRAM(s) Oral daily  atorvastatin 80 milliGRAM(s) Oral at bedtime  clopidogrel Tablet      clopidogrel Tablet 75 milliGRAM(s) Oral daily  enoxaparin Injectable 40 milliGRAM(s) SubCutaneous every 24 hours  losartan 100 milliGRAM(s) Oral daily  metoprolol tartrate 100 milliGRAM(s) Oral two times a day  oxybutynin 5 milliGRAM(s) Oral two times a day  pantoprazole    Tablet 40 milliGRAM(s) Oral before breakfast  polyethylene glycol 3350 17 Gram(s) Oral daily  senna 1 Tablet(s) Oral daily    MEDICATIONS  (PRN):      LABS:                        12.8   5.88  )-----------( 193      ( 26 Jul 2024 06:30 )             38.7     07-26    142  |  108  |  14  ----------------------------<  116<H>  3.9   |  25  |  1.0    Ca    10.5      26 Jul 2024 06:30  Phos  2.8     07-26  Mg     1.8     07-26    TPro  5.6<L>  /  Alb  3.8  /  TBili  1.2  /  DBili  x   /  AST  26  /  ALT  18  /  AlkPhos  101  07-26

## 2024-07-27 NOTE — PROGRESS NOTE ADULT - SUBJECTIVE AND OBJECTIVE BOX
--------IMCOMPLETE NOTE--------    INTERVAL HPI/OVERNIGHT EVENTS:  Patient seen and examined.  number ______ used.    MEDICATIONS  (STANDING):  aspirin enteric coated 81 milliGRAM(s) Oral daily  atorvastatin 80 milliGRAM(s) Oral at bedtime  clopidogrel Tablet 75 milliGRAM(s) Oral daily  clopidogrel Tablet      enoxaparin Injectable 40 milliGRAM(s) SubCutaneous every 24 hours  losartan 100 milliGRAM(s) Oral daily  metoprolol tartrate 100 milliGRAM(s) Oral two times a day  oxybutynin 5 milliGRAM(s) Oral two times a day  pantoprazole    Tablet 40 milliGRAM(s) Oral before breakfast  polyethylene glycol 3350 17 Gram(s) Oral daily  senna 1 Tablet(s) Oral daily    MEDICATIONS  (PRN):    Allergies    No Known Allergies    Intolerances      Vital Signs Last 24 Hrs  T(C): 36.3 (27 Jul 2024 04:25), Max: 36.6 (26 Jul 2024 14:09)  T(F): 97.4 (27 Jul 2024 04:25), Max: 97.9 (26 Jul 2024 14:09)  HR: 53 (27 Jul 2024 04:25) (53 - 66)  BP: 156/78 (27 Jul 2024 04:25) (156/78 - 180/96)  BP(mean): 104 (27 Jul 2024 04:25) (104 - 105)  RR: 18 (27 Jul 2024 04:25) (18 - 18)  SpO2: 95% (27 Jul 2024 04:25) (94% - 96%)    Parameters below as of 27 Jul 2024 04:25  Patient On (Oxygen Delivery Method): room air      Physical exam:  General: No acute distress, awake and alert    Neurologic:  -Mental status: Awake, alert, oriented to person, place, and time. Speech is mildly aphasic. No dysarthria. Intact naming, repetition, and comprehension. Recent and remote memory intact. Follows commands. Attention/concentration intact. Fund of knowledge appropriate.  -Cranial nerves:   II: Visual fields are full to confrontation.  III, IV, VI: Extraocular movements are intact without nystagmus. Pupils equally round and reactive to light BL  V:  Facial sensation V1-V3 equal and intact   VII: Face is symmetric with normal eye closure and smile  VIII: Hearing is bilaterally intact to finger rub  IX, X: Uvula is midline and soft palate rises symmetrically  XI: Head turning and shoulder shrug are intact.  XII: Tongue protrudes midline  Motor: Normal bulk and tone. No pronator drift. Strength right proximal upper extremity 4/5, right distal upper extremity 3/5. LUE 4/5 bilaterally proximal and distal.  Bilateral lower extremities 4/5.  Rapid alternating movements intact and symmetric  Sensation: Intact to light touch bilaterally. No neglect or extinction on double simultaneous testing.  Coordination: No dysmetria on finger-to-nose and heel-to-shin bilaterally  Reflexes: Downgoing toes bilaterally   Gait: Deferred    NIHSS: 1 (aphasia)    LABS:                        12.8   5.88  )-----------( 193      ( 26 Jul 2024 06:30 )             38.7     07-26    142  |  108  |  14  ----------------------------<  116<H>  3.9   |  25  |  1.0    Ca    10.5      26 Jul 2024 06:30  Phos  2.8     07-26  Mg     1.8     07-26    TPro  5.6<L>  /  Alb  3.8  /  TBili  1.2  /  DBili  x   /  AST  26  /  ALT  18  /  AlkPhos  101  07-26      Urinalysis Basic - ( 26 Jul 2024 06:30 )    Color: x / Appearance: x / SG: x / pH: x  Gluc: 116 mg/dL / Ketone: x  / Bili: x / Urobili: x   Blood: x / Protein: x / Nitrite: x   Leuk Esterase: x / RBC: x / WBC x   Sq Epi: x / Non Sq Epi: x / Bacteria: x        RADIOLOGY & ADDITIONAL TESTS:     INTERVAL HPI/OVERNIGHT EVENTS:   Patient seen and examined. Patient feels well, denying any acute complaint.     MEDICATIONS  (STANDING):  aspirin enteric coated 81 milliGRAM(s) Oral daily  atorvastatin 80 milliGRAM(s) Oral at bedtime  clopidogrel Tablet 75 milliGRAM(s) Oral daily  clopidogrel Tablet      enoxaparin Injectable 40 milliGRAM(s) SubCutaneous every 24 hours  losartan 100 milliGRAM(s) Oral daily  metoprolol tartrate 100 milliGRAM(s) Oral two times a day  oxybutynin 5 milliGRAM(s) Oral two times a day  pantoprazole    Tablet 40 milliGRAM(s) Oral before breakfast  polyethylene glycol 3350 17 Gram(s) Oral daily  senna 1 Tablet(s) Oral daily    MEDICATIONS  (PRN):    Allergies    No Known Allergies    Intolerances      Vital Signs Last 24 Hrs  T(C): 36.3 (27 Jul 2024 04:25), Max: 36.6 (26 Jul 2024 14:09)  T(F): 97.4 (27 Jul 2024 04:25), Max: 97.9 (26 Jul 2024 14:09)  HR: 53 (27 Jul 2024 04:25) (53 - 66)  BP: 156/78 (27 Jul 2024 04:25) (156/78 - 180/96)  BP(mean): 104 (27 Jul 2024 04:25) (104 - 105)  RR: 18 (27 Jul 2024 04:25) (18 - 18)  SpO2: 95% (27 Jul 2024 04:25) (94% - 96%)    Parameters below as of 27 Jul 2024 04:25  Patient On (Oxygen Delivery Method): room air      Physical exam:  General: No acute distress, awake and alert    Neurologic:  -Mental status: Awake, alert, oriented to person, place, and time. Speech is mildly aphasic. No dysarthria. Intact naming, repetition, and comprehension. Recent and remote memory intact. Follows commands. Attention/concentration intact. Fund of knowledge appropriate.  -Cranial nerves:   II: Visual fields are full to confrontation.  III, IV, VI: Extraocular movements are intact without nystagmus. Pupils equally round and reactive to light BL  V:  Facial sensation V1-V3 equal and intact   VII: Face is symmetric with normal eye closure and smile  VIII: Hearing is bilaterally intact to finger rub  IX, X: Uvula is midline and soft palate rises symmetrically  XI: Head turning and shoulder shrug are intact.  XII: Tongue protrudes midline  Motor: Normal bulk and tone. No pronator drift. Strength right proximal upper extremity 4/5, right distal upper extremity 3/5. LUE 4/5 bilaterally proximal and distal.  Bilateral lower extremities 4/5.  Rapid alternating movements intact and symmetric  Sensation: Intact to light touch bilaterally. No neglect or extinction on double simultaneous testing.  Coordination: No dysmetria on finger-to-nose and heel-to-shin bilaterally  Reflexes: Downgoing toes bilaterally   Gait: Deferred    NIHSS: 1 (aphasia)    LABS:                        12.8   5.88  )-----------( 193      ( 26 Jul 2024 06:30 )             38.7     07-26    142  |  108  |  14  ----------------------------<  116<H>  3.9   |  25  |  1.0    Ca    10.5      26 Jul 2024 06:30  Phos  2.8     07-26  Mg     1.8     07-26    TPro  5.6<L>  /  Alb  3.8  /  TBili  1.2  /  DBili  x   /  AST  26  /  ALT  18  /  AlkPhos  101  07-26      Urinalysis Basic - ( 26 Jul 2024 06:30 )    Color: x / Appearance: x / SG: x / pH: x  Gluc: 116 mg/dL / Ketone: x  / Bili: x / Urobili: x   Blood: x / Protein: x / Nitrite: x   Leuk Esterase: x / RBC: x / WBC x   Sq Epi: x / Non Sq Epi: x / Bacteria: x

## 2024-07-27 NOTE — PROGRESS NOTE ADULT - ASSESSMENT
83-year-old right handed, no substance use, living with , retired; with PMHx of HTN, HLD, urinary incontinence, GERD; presented for evaluation of stroke. Patient presented with dysarthria and difficulty swallow that wouldn't resolve. Initially she thought it was a cold, but finally today she visited her PCP Dr. Dominguez, who ordered the workup. Found to have acute infarct in left corona radiata/basal ganglia on MRI. CTA did not show evidence of major vascular stenosis, occlusion, or aneurysm. Etiology likely small vessel disease vs ESUS.    #Cerebral infarction in left corona radiata/basal ganglia   Etiology likely small vessel disease vs ESUS. LDL 84, A1c 6  S/P plavix 300 mg x1  - continue aspirin 81mg and plavix 75mg daily for 21 days   - continue atorvastatin 80mg daily  - EP consulted, Pt and family refused ILR placement. Risks and benefits discussed at length.   - q8hr stroke neuro checks and vitals  - Goal -160  - PT/OT/Physiatry- inpatient rehab    #HTN  - Goal -180  - continue home blood pressure medication metoprolol and irbersartan    #HLD  Hold home med pravastatin  - C/w high dose statin  - LDL results: 84    #Urinary incontinence  On home med Myrbetriq and tolterodine  Hold tolterodine for now  - C/w oxybutynin 5 mg BID    #Misc:   DVT Prophylaxis: Lovenox sq  GI prophylaxis for GERD: Pantoprazole 40 mg QD  Diet: regular  Dispo: inpatient rehab 83-year-old right handed, no substance use, living with , retired; with PMHx of HTN, HLD, urinary incontinence, GERD; presented for evaluation of stroke. Patient presented with dysarthria and difficulty swallow that wouldn't resolve. Initially she thought it was a cold, but finally today she visited her PCP Dr. Dominguez, who ordered the workup. Found to have acute infarct in left corona radiata/basal ganglia on MRI. CTA did not show evidence of major vascular stenosis, occlusion, or aneurysm. Etiology likely small vessel disease vs ESUS.    #Cerebral infarction in left corona radiata/basal ganglia   Etiology likely small vessel disease vs ESUS. LDL 84, A1c 6  S/P Plavix 300 mg x1  - continue aspirin 81mg and plavix 75mg daily for 21 days   - continue atorvastatin 80mg daily  - EP consulted, Pt and family refused ILR placement. Risks and benefits discussed at length.   - q8hr stroke neuro checks and vitals  - Goal -160  - PT/OT/Physiatry- inpatient rehab    #HTN  - Goal -180  - continue home blood pressure medication metoprolol and irbersartan    #HLD  Hold home med pravastatin  - C/w high dose statin  - LDL results: 84    #Urinary incontinence  On home med Myrbetriq and tolterodine  Hold tolterodine for now  - C/w oxybutynin 5 mg BID    #Misc:   DVT Prophylaxis: Lovenox sq  GI prophylaxis for GERD: Pantoprazole 40 mg QD  Diet: regular  Dispo: inpatient rehab

## 2024-07-27 NOTE — PROGRESS NOTE ADULT - ASSESSMENT
83 y/o right handed woman with PMH of HTN, hyperlipidemia, chronic LE edema, urinary incontinence and GERD presented to the ED on 7/23 for evaluation of stroke. Per family, she developed dysarthria and right sided weakness and numbness about 4 days prior to admission but refused to seek medical attention. When she finally saw her PCP Dr. Jonathan Hurley, he suspected a stroke and sent her for a CT scan and then she had a MRI which showed acute infarct in left corona radiata/basal ganglia. CTA did not show evidence of major vascular stenosis, occlusion, or aneurysm.  Home meds reviewed at bedside.    1. Acute stroke of left corona radiata/basal ganglia  dysarthria, right sided weakness  on ASA/Plavix and high intensity statin  management per neurology  possible etiology small vessel   neuro checks  Goal -160  speech and swallow eval appreciated ->Regular diet w/thin liquids   EKG reviewed - NSR  LDL 84  A1c 6  EP consulted for loop recorder - pt/family refused at this time per neuro note  ECHO report reviewed: EF 68%, mild AI  continue PT/OT including stair training  rehab consult: inpt rehab candidate - awaiting bed availability    2. HTN - on metoprolol and ARB    3. Hyperlipidemia on high intensity statin    4. Urinary incontinence  home med: Myrbetriq and tolterodine  on oxybutynin 5 mg BID here    5. GERD on PPI    6. DVT Prophylaxis: Lovenox       full code  Pt is primarily managed by neurology  Handoff: awaiting bed on 4[facility] rehab

## 2024-07-28 LAB
ALBUMIN SERPL ELPH-MCNC: 4.1 G/DL — SIGNIFICANT CHANGE UP (ref 3.5–5.2)
ALP SERPL-CCNC: 89 U/L — SIGNIFICANT CHANGE UP (ref 30–115)
ALT FLD-CCNC: 34 U/L — SIGNIFICANT CHANGE UP (ref 0–41)
ANION GAP SERPL CALC-SCNC: 14 MMOL/L — SIGNIFICANT CHANGE UP (ref 7–14)
AST SERPL-CCNC: 44 U/L — HIGH (ref 0–41)
BASOPHILS # BLD AUTO: 0.03 K/UL — SIGNIFICANT CHANGE UP (ref 0–0.2)
BASOPHILS NFR BLD AUTO: 0.5 % — SIGNIFICANT CHANGE UP (ref 0–1)
BILIRUB SERPL-MCNC: 1 MG/DL — SIGNIFICANT CHANGE UP (ref 0.2–1.2)
BUN SERPL-MCNC: 17 MG/DL — SIGNIFICANT CHANGE UP (ref 10–20)
CALCIUM SERPL-MCNC: 10.7 MG/DL — HIGH (ref 8.4–10.5)
CHLORIDE SERPL-SCNC: 108 MMOL/L — SIGNIFICANT CHANGE UP (ref 98–110)
CO2 SERPL-SCNC: 19 MMOL/L — SIGNIFICANT CHANGE UP (ref 17–32)
CREAT SERPL-MCNC: 1 MG/DL — SIGNIFICANT CHANGE UP (ref 0.7–1.5)
EGFR: 56 ML/MIN/1.73M2 — LOW
EOSINOPHIL # BLD AUTO: 0.14 K/UL — SIGNIFICANT CHANGE UP (ref 0–0.7)
EOSINOPHIL NFR BLD AUTO: 2.1 % — SIGNIFICANT CHANGE UP (ref 0–8)
GLUCOSE BLDC GLUCOMTR-MCNC: 109 MG/DL — HIGH (ref 70–99)
GLUCOSE BLDC GLUCOMTR-MCNC: 115 MG/DL — HIGH (ref 70–99)
GLUCOSE BLDC GLUCOMTR-MCNC: 117 MG/DL — HIGH (ref 70–99)
GLUCOSE BLDC GLUCOMTR-MCNC: 99 MG/DL — SIGNIFICANT CHANGE UP (ref 70–99)
GLUCOSE SERPL-MCNC: 110 MG/DL — HIGH (ref 70–99)
HCT VFR BLD CALC: 39.7 % — SIGNIFICANT CHANGE UP (ref 37–47)
HGB BLD-MCNC: 13.1 G/DL — SIGNIFICANT CHANGE UP (ref 12–16)
IMM GRANULOCYTES NFR BLD AUTO: 0.6 % — HIGH (ref 0.1–0.3)
LYMPHOCYTES # BLD AUTO: 1.16 K/UL — LOW (ref 1.2–3.4)
LYMPHOCYTES # BLD AUTO: 17.7 % — LOW (ref 20.5–51.1)
MAGNESIUM SERPL-MCNC: 1.8 MG/DL — SIGNIFICANT CHANGE UP (ref 1.8–2.4)
MCHC RBC-ENTMCNC: 31.7 PG — HIGH (ref 27–31)
MCHC RBC-ENTMCNC: 33 G/DL — SIGNIFICANT CHANGE UP (ref 32–37)
MCV RBC AUTO: 96.1 FL — SIGNIFICANT CHANGE UP (ref 81–99)
MONOCYTES # BLD AUTO: 0.48 K/UL — SIGNIFICANT CHANGE UP (ref 0.1–0.6)
MONOCYTES NFR BLD AUTO: 7.3 % — SIGNIFICANT CHANGE UP (ref 1.7–9.3)
NEUTROPHILS # BLD AUTO: 4.71 K/UL — SIGNIFICANT CHANGE UP (ref 1.4–6.5)
NEUTROPHILS NFR BLD AUTO: 71.8 % — SIGNIFICANT CHANGE UP (ref 42.2–75.2)
NRBC # BLD: 0 /100 WBCS — SIGNIFICANT CHANGE UP (ref 0–0)
PLATELET # BLD AUTO: 152 K/UL — SIGNIFICANT CHANGE UP (ref 130–400)
PMV BLD: 10.9 FL — HIGH (ref 7.4–10.4)
POTASSIUM SERPL-MCNC: 4.5 MMOL/L — SIGNIFICANT CHANGE UP (ref 3.5–5)
POTASSIUM SERPL-SCNC: 4.5 MMOL/L — SIGNIFICANT CHANGE UP (ref 3.5–5)
PROT SERPL-MCNC: 6 G/DL — SIGNIFICANT CHANGE UP (ref 6–8)
RBC # BLD: 4.13 M/UL — LOW (ref 4.2–5.4)
RBC # FLD: 13.4 % — SIGNIFICANT CHANGE UP (ref 11.5–14.5)
SODIUM SERPL-SCNC: 141 MMOL/L — SIGNIFICANT CHANGE UP (ref 135–146)
WBC # BLD: 6.56 K/UL — SIGNIFICANT CHANGE UP (ref 4.8–10.8)
WBC # FLD AUTO: 6.56 K/UL — SIGNIFICANT CHANGE UP (ref 4.8–10.8)

## 2024-07-28 PROCEDURE — 99232 SBSQ HOSP IP/OBS MODERATE 35: CPT

## 2024-07-28 RX ORDER — MELATONIN 3 MG
3 TABLET ORAL AT BEDTIME
Refills: 0 | Status: DISCONTINUED | OUTPATIENT
Start: 2024-07-28 | End: 2024-07-29

## 2024-07-28 RX ADMIN — Medication 100 MILLIGRAM(S): at 17:10

## 2024-07-28 RX ADMIN — OXYBUTYNIN CHLORIDE 5 MILLIGRAM(S): 5 TABLET, FILM COATED, EXTENDED RELEASE ORAL at 05:21

## 2024-07-28 RX ADMIN — Medication 3 MILLIGRAM(S): at 21:41

## 2024-07-28 RX ADMIN — CLOPIDOGREL BISULFATE 75 MILLIGRAM(S): 75 TABLET, FILM COATED ORAL at 11:24

## 2024-07-28 RX ADMIN — ATORVASTATIN CALCIUM 80 MILLIGRAM(S): 40 TABLET, FILM COATED ORAL at 21:41

## 2024-07-28 RX ADMIN — ENOXAPARIN SODIUM 40 MILLIGRAM(S): 120 INJECTION SUBCUTANEOUS at 21:41

## 2024-07-28 RX ADMIN — LOSARTAN POTASSIUM 100 MILLIGRAM(S): 50 TABLET, FILM COATED ORAL at 05:20

## 2024-07-28 RX ADMIN — Medication 81 MILLIGRAM(S): at 11:24

## 2024-07-28 RX ADMIN — OXYBUTYNIN CHLORIDE 5 MILLIGRAM(S): 5 TABLET, FILM COATED, EXTENDED RELEASE ORAL at 17:10

## 2024-07-28 RX ADMIN — Medication 100 MILLIGRAM(S): at 05:21

## 2024-07-28 RX ADMIN — PANTOPRAZOLE SODIUM 40 MILLIGRAM(S): 20 TABLET, DELAYED RELEASE ORAL at 05:21

## 2024-07-28 NOTE — PROGRESS NOTE ADULT - SUBJECTIVE AND OBJECTIVE BOX
BEV MORGAN  83y Female    INTERVAL HPI/OVERNIGHT EVENTS: Pt doing well, Family bedside.     T(F): 98 (07-26-24 @ 04:53), Max: 98.1 (07-25-24 @ 13:03)  HR: 67 (07-26-24 @ 04:53) (59 - 67)  BP: 157/77 (07-26-24 @ 05:20) (156/78 - 173/87)  RR: 18 (07-26-24 @ 04:53) (16 - 18)  SpO2: 98% (07-26-24 @ 04:53) (97% - 98%)  I&O's Summary    25 Jul 2024 07:01  -  26 Jul 2024 07:00  --------------------------------------------------------  IN: 0 mL / OUT: 1 mL / NET: -1 mL      PHYSICAL EXAM:  GENERAL: NAD  HEAD:  Normocephalic  EYES:  conjunctiva and sclera clear  ENMT: Moist mucous membranes  NERVOUS SYSTEM:  Alert, awake, Good concentration, mild dysarthria (seems improved), improved strength of right side  CHEST/LUNG: CTA b/l  HEART: Regular rate and rhythm  ABDOMEN: Soft, Nontender, Nondistended  EXTREMITIES:  decreased LE edema  SKIN: warm, dry    Consultant(s) Notes Reviewed:  [x ] YES  [ ] NO  Care Discussed with Consultants/Other Providers [ x] YES  [ ] NO    MEDICATIONS  (STANDING):  aspirin enteric coated 81 milliGRAM(s) Oral daily  atorvastatin 80 milliGRAM(s) Oral at bedtime  clopidogrel Tablet      clopidogrel Tablet 75 milliGRAM(s) Oral daily  enoxaparin Injectable 40 milliGRAM(s) SubCutaneous every 24 hours  losartan 100 milliGRAM(s) Oral daily  metoprolol tartrate 100 milliGRAM(s) Oral two times a day  oxybutynin 5 milliGRAM(s) Oral two times a day  pantoprazole    Tablet 40 milliGRAM(s) Oral before breakfast  polyethylene glycol 3350 17 Gram(s) Oral daily  senna 1 Tablet(s) Oral daily    MEDICATIONS  (PRN):      LABS:                        12.8   5.88  )-----------( 193      ( 26 Jul 2024 06:30 )             38.7     07-26    142  |  108  |  14  ----------------------------<  116<H>  3.9   |  25  |  1.0    Ca    10.5      26 Jul 2024 06:30  Phos  2.8     07-26  Mg     1.8     07-26    TPro  5.6<L>  /  Alb  3.8  /  TBili  1.2  /  DBili  x   /  AST  26  /  ALT  18  /  AlkPhos  101  07-26                                   KATHIBEV NEWBY  83y Female    INTERVAL HPI/OVERNIGHT EVENTS: Pt doing well, sitting in chair bedside. Son present.     T(F): 98 (07-26-24 @ 04:53), Max: 98.1 (07-25-24 @ 13:03)  HR: 67 (07-26-24 @ 04:53) (59 - 67)  BP: 157/77 (07-26-24 @ 05:20) (156/78 - 173/87)  RR: 18 (07-26-24 @ 04:53) (16 - 18)  SpO2: 98% (07-26-24 @ 04:53) (97% - 98%)  I&O's Summary    25 Jul 2024 07:01  -  26 Jul 2024 07:00  --------------------------------------------------------  IN: 0 mL / OUT: 1 mL / NET: -1 mL      PHYSICAL EXAM:  GENERAL: NAD  HEAD:  Normocephalic  EYES:  conjunctiva and sclera clear  ENMT: Moist mucous membranes  NERVOUS SYSTEM:  Alert, awake, Good concentration, mild dysarthria (seems improved), improved strength of right side  CHEST/LUNG: CTA b/l  HEART: Regular rate and rhythm  ABDOMEN: Soft, Nontender, Nondistended  EXTREMITIES:  decreased LE edema  SKIN: warm, dry    Consultant(s) Notes Reviewed:  [x ] YES  [ ] NO  Care Discussed with Consultants/Other Providers [ x] YES  [ ] NO    MEDICATIONS  (STANDING):  aspirin enteric coated 81 milliGRAM(s) Oral daily  atorvastatin 80 milliGRAM(s) Oral at bedtime  clopidogrel Tablet      clopidogrel Tablet 75 milliGRAM(s) Oral daily  enoxaparin Injectable 40 milliGRAM(s) SubCutaneous every 24 hours  losartan 100 milliGRAM(s) Oral daily  metoprolol tartrate 100 milliGRAM(s) Oral two times a day  oxybutynin 5 milliGRAM(s) Oral two times a day  pantoprazole    Tablet 40 milliGRAM(s) Oral before breakfast  polyethylene glycol 3350 17 Gram(s) Oral daily  senna 1 Tablet(s) Oral daily    MEDICATIONS  (PRN):      LABS:                        12.8   5.88  )-----------( 193      ( 26 Jul 2024 06:30 )             38.7     07-26    142  |  108  |  14  ----------------------------<  116<H>  3.9   |  25  |  1.0    Ca    10.5      26 Jul 2024 06:30  Phos  2.8     07-26  Mg     1.8     07-26    TPro  5.6<L>  /  Alb  3.8  /  TBili  1.2  /  DBili  x   /  AST  26  /  ALT  18  /  AlkPhos  101  07-26

## 2024-07-28 NOTE — PROGRESS NOTE ADULT - ASSESSMENT
81 y/o right handed woman with PMH of HTN, hyperlipidemia, chronic LE edema, urinary incontinence and GERD presented to the ED on 7/23 for evaluation of stroke. Per family, she developed dysarthria and right sided weakness and numbness about 4 days prior to admission but refused to seek medical attention. When she finally saw her PCP Dr. Jonathan Hurley, he suspected a stroke and sent her for a CT scan and then she had a MRI which showed acute infarct in left corona radiata/basal ganglia. CTA did not show evidence of major vascular stenosis, occlusion, or aneurysm.  Home meds reviewed at bedside.    # Acute stroke of left corona radiata/basal ganglia  Patient is primarily managed by neurology  dysarthria, right sided weakness  on ASA/Plavix and high intensity statin  possible etiology small vessel   neuro checks  Goal -160  speech and swallow eval appreciated ->Regular diet w/thin liquids   EKG reviewed - NSR, LDL 84, A1c 6  EP consulted for loop recorder - pt/family refused at this time   ECHO report reviewed: EF 68%, mild AI  continue PT/OT including stair training  rehab consult: inpt rehab candidate - awaiting bed availability    2. HTN - on metoprolol and ARB    3. Hyperlipidemia on high intensity statin    4. Urinary incontinence  home med: Myrbetriq and tolterodine  on oxybutynin 5 mg BID here    5. GERD on PPI    6. DVT Prophylaxis: Lovenox       full code  Pt is primarily managed by neurology  Handoff: awaiting bed on [facility] rehab   81 y/o right handed woman with PMH of HTN, hyperlipidemia, chronic LE edema, urinary incontinence and GERD presented to the ED on 7/23 for evaluation of stroke. Per family, she developed dysarthria and right sided weakness and numbness about 4 days prior to admission but refused to seek medical attention. When she finally saw her PCP Dr. Jonathan Hurley, he suspected a stroke and sent her for a CT scan and then she had a MRI which showed acute infarct in left corona radiata/basal ganglia. CTA did not show evidence of major vascular stenosis, occlusion, or aneurysm. Home meds reviewed at bedside.    # Acute stroke of left corona radiata/basal ganglia  Patient is primarily managed by neurology  dysarthria, right sided weakness  on ASA/Plavix and high intensity statin  possible etiology small vessel   neuro checks  Goal -160  speech and swallow eval appreciated ->Regular diet w/thin liquids   EKG reviewed - NSR, LDL 84, A1c 6  EP consulted for loop recorder - pt/family refused at this time   ECHO report reviewed: EF 68%, mild AI  continue PT/OT including stair training  rehab consult: inpt rehab candidate - awaiting bed availability    # HTN   - on metoprolol and ARB    # Hyperlipidemia on high intensity statin    # Urinary incontinence  home med: Myrbetriq and tolterodine  on oxybutynin 5 mg BID here    #GERD on PPI    #DVT Prophylaxis: Lovenox       full code  Pt is primarily managed by neurology  Handoff: awaiting bed on [facility] rehab

## 2024-07-28 NOTE — PROGRESS NOTE ADULT - ATTENDING COMMENTS
Pt is a 84 yo F with PMhx of HTN, HLD, GERD, who presented with stroke on MRI brain from PCP. MRI was obtained for 1 week h/o speech difficulty and mild dysphagia.     Impr: acute/subacute ischemic stroke in left BG  CTA head/neck without significant flow limiting stenosis  [id number] with extensive chronic ischemic white matter disease  Etiology:  vs ESUS  PTA: ASA-> DAPT x 21 days then ASA. High intensity statin  TTE with EF 68%, -PFO. pt refusing ILR placement at this time.   PT/OT/ST, tele monitoring, dispo planning- acute rehab
Pt is a 82 yo F with PMhx of HTN, HLD, GERD, who presented with stroke on MRI brain from PCP. MRI was obtained for 1 week h/o speech difficulty and mild dysphagia.     Impr: acute/subacute ischemic stroke in left BG  CTA head/neck without significant flow limiting stenosis  [id number] with extensive chronic ischemic white matter disease  Etiology:  vs ESUS  PTA: ASA-> DAPT x 21 days then ASA. High intensity statin  TTE with EF 68%, -PFO. pt refusing ILR at this time.   PT/OT/ST, tele monitoring, dispo planning- acute rehab.
please refer to attending attestation on H&P
Pt is a 84 yo F with PMhx of HTN, HLD, GERD, who presented with stroke on MRI brain from PCP. MRI was obtained for 1 week h/o speech difficulty and mild dysphagia. pt also notes mild right hemiparesis, RLE>RUE.     Impr: acute/subacute ischemic stroke in left BG  CTA head/neck without significant flow limiting stenosis  [id number] with extensive chronic ischemic white matter disease  Etiology:  vs ESUS  PTA: ASA-> DAPT x 21 days then ASA. High intensity statin  TTE with EF 68%, -PFO. ILR placement tomorrow  PT/OT/ST, tele monitoring, dispo planning- 4A
Pt is a 82 yo F with PMhx of HTN, HLD, GERD, who presented with stroke on MRI brain from PCP. MRI was obtained for 1 week h/o speech difficulty and mild dysphagia.     Impr: acute/subacute ischemic stroke in left BG  CTA head/neck without significant flow limiting stenosis  [id number] with extensive chronic ischemic white matter disease  Etiology:  vs ESUS  PTA: ASA-> DAPT x 21 days then ASA. High intensity statin  TTE with EF 68%, -PFO. pt now refusing ILR.   PT/OT/ST, tele monitoring, dispo planning- 4A, awaiting bed/insurance approval .

## 2024-07-28 NOTE — PROGRESS NOTE ADULT - SUBJECTIVE AND OBJECTIVE BOX
Neurology Stroke Progress Note    INTERVAL HPI/OVERNIGHT EVENTS:  Patient seen and examined. No events overnight. Pt mentioned not able to sleep well last night. Awaiting 4A rehab bed.      MEDICATIONS  (STANDING):  aspirin enteric coated 81 milliGRAM(s) Oral daily  atorvastatin 80 milliGRAM(s) Oral at bedtime  clopidogrel Tablet 75 milliGRAM(s) Oral daily  clopidogrel Tablet      enoxaparin Injectable 40 milliGRAM(s) SubCutaneous every 24 hours  losartan 100 milliGRAM(s) Oral daily  melatonin 3 milliGRAM(s) Oral at bedtime  metoprolol tartrate 100 milliGRAM(s) Oral two times a day  oxybutynin 5 milliGRAM(s) Oral two times a day  pantoprazole    Tablet 40 milliGRAM(s) Oral before breakfast  polyethylene glycol 3350 17 Gram(s) Oral daily  senna 1 Tablet(s) Oral daily    MEDICATIONS  (PRN):    Allergies    No Known Allergies    Intolerances      Vital Signs Last 24 Hrs  T(C): 36.6 (28 Jul 2024 04:35), Max: 36.7 (27 Jul 2024 21:53)  T(F): 97.9 (28 Jul 2024 04:35), Max: 98.1 (27 Jul 2024 21:53)  HR: 54 (28 Jul 2024 06:51) (54 - 86)  BP: 158/70 (28 Jul 2024 06:51) (110/74 - 162/79)  BP(mean): 99 (28 Jul 2024 06:51) (86 - 107)  RR: 18 (28 Jul 2024 04:35) (18 - 18)  SpO2: 95% (28 Jul 2024 04:35) (95% - 96%)    Parameters below as of 28 Jul 2024 04:35  Patient On (Oxygen Delivery Method): room air        Physical exam:  General: No acute distress, awake and alert    Neurologic:  -Mental status: Awake, alert, oriented to person, place, and time. Speech is mildly aphasic. No dysarthria. Intact naming, repetition, and comprehension. Recent and remote memory intact. Follows commands. Attention/concentration intact. Fund of knowledge appropriate.  -Cranial nerves:   II: Visual fields are full to confrontation.  III, IV, VI: Extraocular movements are intact without nystagmus. Pupils equally round and reactive to light BL  V:  Facial sensation V1-V3 equal and intact   VII: Face is symmetric with normal eye closure and smile  VIII: Hearing is bilaterally intact to finger rub  IX, X: Uvula is midline and soft palate rises symmetrically  XI: Head turning and shoulder shrug are intact.  XII: Tongue protrudes midline  Motor: Normal bulk and tone. No pronator drift. Strength right proximal upper extremity 4/5, right distal upper extremity 3/5. LUE 4/5 bilaterally proximal and distal.  Bilateral lower extremities 4/5.  Rapid alternating movements intact and symmetric  Sensation: Intact to light touch bilaterally. No neglect or extinction on double simultaneous testing.  Coordination: No dysmetria on finger-to-nose and heel-to-shin bilaterally  Reflexes: Downgoing toes bilaterally   Gait: Deferred    NIHSS: 1 (aphasia)    LABS:                        13.1   6.56  )-----------( 152      ( 28 Jul 2024 12:29 )             39.7     07-28    141  |  108  |  17  ----------------------------<  110<H>  4.5   |  19  |  1.0    Ca    10.7<H>      28 Jul 2024 12:29  Mg     1.8     07-28    TPro  6.0  /  Alb  4.1  /  TBili  1.0  /  DBili  x   /  AST  44<H>  /  ALT  34  /  AlkPhos  89  07-28      Urinalysis Basic - ( 28 Jul 2024 12:29 )    Color: x / Appearance: x / SG: x / pH: x  Gluc: 110 mg/dL / Ketone: x  / Bili: x / Urobili: x   Blood: x / Protein: x / Nitrite: x   Leuk Esterase: x / RBC: x / WBC x   Sq Epi: x / Non Sq Epi: x / Bacteria: x        RADIOLOGY & ADDITIONAL TESTS: Reviewed

## 2024-07-28 NOTE — PROGRESS NOTE ADULT - ASSESSMENT
83-year-old right handed, no substance use, living with , retired; with PMHx of HTN, HLD, urinary incontinence, GERD; presented for evaluation of stroke. Patient presented with dysarthria and difficulty swallow that wouldn't resolve. Initially she thought it was a cold, but finally today she visited her PCP Dr. Dominguez, who ordered the workup. Found to have acute infarct in left corona radiata/basal ganglia on MRI. CTA did not show evidence of major vascular stenosis, occlusion, or aneurysm. Etiology likely small vessel disease vs ESUS.    #Cerebral infarction in left corona radiata/basal ganglia   Etiology likely small vessel disease vs ESUS. LDL 84, A1c 6  S/P Plavix 300 mg x1  - continue aspirin 81mg and plavix 75mg daily for 21 days   - continue atorvastatin 80mg daily  - EP consulted, Pt and family refused ILR placement. Risks and benefits discussed at length.   - q8hr stroke neuro checks and vitals  - Goal -160  - PT/OT/Physiatry- inpatient rehab    #Sleep  - start melatonin 3mg bedtime    #HTN  - Goal -180  - continue home blood pressure medication metoprolol and irbersartan    #HLD  Hold home med pravastatin  - C/w high dose statin  - LDL results: 84    #Urinary incontinence  On home med Myrbetriq and tolterodine  Hold tolterodine for now  - C/w oxybutynin 5 mg BID    #Misc:   DVT Prophylaxis: Lovenox sq  GI prophylaxis for GERD: Pantoprazole 40 mg QD  Diet: regular  Dispo: inpatient rehab    [facility] re[facility] possible dc tomorrow

## 2024-07-28 NOTE — PROGRESS NOTE ADULT - TIME BILLING
direct pt care
direct pt care
Review of imaging and chart; obtaining history; examination of pt; discussion and coordination of care.

## 2024-07-29 ENCOUNTER — TRANSCRIPTION ENCOUNTER (OUTPATIENT)
Age: 84
End: 2024-07-29

## 2024-07-29 VITALS
OXYGEN SATURATION: 97 % | SYSTOLIC BLOOD PRESSURE: 121 MMHG | DIASTOLIC BLOOD PRESSURE: 50 MMHG | TEMPERATURE: 98 F | RESPIRATION RATE: 18 BRPM | HEART RATE: 56 BPM

## 2024-07-29 PROBLEM — K21.9 GASTRO-ESOPHAGEAL REFLUX DISEASE WITHOUT ESOPHAGITIS: Chronic | Status: ACTIVE | Noted: 2024-07-23

## 2024-07-29 LAB
GLUCOSE BLDC GLUCOMTR-MCNC: 117 MG/DL — HIGH (ref 70–99)
GLUCOSE BLDC GLUCOMTR-MCNC: 145 MG/DL — HIGH (ref 70–99)

## 2024-07-29 PROCEDURE — 99239 HOSP IP/OBS DSCHRG MGMT >30: CPT

## 2024-07-29 RX ORDER — ATORVASTATIN CALCIUM 40 MG/1
1 TABLET, FILM COATED ORAL
Qty: 30 | Refills: 2
Start: 2024-07-29 | End: 2024-10-26

## 2024-07-29 RX ORDER — ATORVASTATIN CALCIUM 40 MG/1
1 TABLET, FILM COATED ORAL
Qty: 0 | Refills: 0 | DISCHARGE
Start: 2024-07-29

## 2024-07-29 RX ORDER — CLOPIDOGREL BISULFATE 75 MG/1
1 TABLET, FILM COATED ORAL
Qty: 15 | Refills: 0
Start: 2024-07-29 | End: 2024-08-12

## 2024-07-29 RX ADMIN — CLOPIDOGREL BISULFATE 75 MILLIGRAM(S): 75 TABLET, FILM COATED ORAL at 11:23

## 2024-07-29 RX ADMIN — PANTOPRAZOLE SODIUM 40 MILLIGRAM(S): 20 TABLET, DELAYED RELEASE ORAL at 05:43

## 2024-07-29 RX ADMIN — OXYBUTYNIN CHLORIDE 5 MILLIGRAM(S): 5 TABLET, FILM COATED, EXTENDED RELEASE ORAL at 05:44

## 2024-07-29 RX ADMIN — LOSARTAN POTASSIUM 100 MILLIGRAM(S): 50 TABLET, FILM COATED ORAL at 05:44

## 2024-07-29 RX ADMIN — Medication 81 MILLIGRAM(S): at 11:22

## 2024-07-29 NOTE — PROGRESS NOTE ADULT - PROVIDER SPECIALTY LIST ADULT
Hospitalist
Neurology
Hospitalist
Neurology
Neurology
Hospitalist

## 2024-07-29 NOTE — DISCHARGE NOTE NURSING/CASE MANAGEMENT/SOCIAL WORK - PATIENT PORTAL LINK FT
You can access the FollowMyHealth Patient Portal offered by [facility] by registering at the following website: [email or web address]. By joining [facility]’s FollowMyHealth portal, you will also be able to view your health information using other applications (apps) compatible with our system.

## 2024-07-29 NOTE — PROGRESS NOTE ADULT - REASON FOR ADMISSION
Left corona radiata cerebral infarction after 4 days of dysarthria and dysphagia

## 2024-07-29 NOTE — PROGRESS NOTE ADULT - SUBJECTIVE AND OBJECTIVE BOX
Neurology Progress Note    Interval History:      HPI:  82-year-old right handed, no substance use, living with , retired; with PMHx of HTN, HLD, urinary incontinence, GERD; presented today for evaluation of stroke.  Patient had MRI performed which is indicative of stroke. Patient presented 4 days ago in the morning with dysarthria and difficulty swallow that wouldn't resolve. Initially she thought it was a cold, but finally today she visited her PCP Dr. Dominguez, who ordered the workup and referred her to ED for evaluation. Patient will be admitted to neurology for further workup and management    In ED:    T(C): 37.2 (23 Jul 2024 19:41), Max: 37.2 (23 Jul 2024 19:41)  T(F): 98.9 (23 Jul 2024 19:41), Max: 98.9 (23 Jul 2024 19:41)  HR: 64 (23 Jul 2024 19:41) (64 - 64)  BP: 153/79 (23 Jul 2024 19:41) (153/79 - 153/79)  RR: 18 (23 Jul 2024 19:41) (18 - 18)  SpO2: 95% (23 Jul 2024 19:41) (95% - 95%)  Patient On (Oxygen Delivery Method): room air    Labs: HCO3 17 . Ca 10.7. Mg 1.6    CT HEAD: No evidence of acute intracranial hemorrhage, mass, or midline shift. Chronic microvascular changes.    CTA: Negative CTA of the head and neck. No evidence of major vascular stenosis, occlusion, or aneurysm.         (23 Jul 2024 23:03)      PAST MEDICAL & SURGICAL HISTORY:  Urinary incontinence    HTN (hypertension)    Hyperlipemia    GERD (gastroesophageal reflux disease)    No significant past surgical history            Medications:  aspirin enteric coated 81 milliGRAM(s) Oral daily  atorvastatin 80 milliGRAM(s) Oral at bedtime  clopidogrel Tablet      clopidogrel Tablet 75 milliGRAM(s) Oral daily  enoxaparin Injectable 40 milliGRAM(s) SubCutaneous every 24 hours  losartan 100 milliGRAM(s) Oral daily  melatonin 3 milliGRAM(s) Oral at bedtime  metoprolol tartrate 100 milliGRAM(s) Oral two times a day  oxybutynin 5 milliGRAM(s) Oral two times a day  pantoprazole    Tablet 40 milliGRAM(s) Oral before breakfast  polyethylene glycol 3350 17 Gram(s) Oral daily  senna 1 Tablet(s) Oral daily      Vital Signs Last 24 Hrs  T(C): 36.4 (29 Jul 2024 04:30), Max: 36.7 (28 Jul 2024 21:15)  T(F): 97.6 (29 Jul 2024 04:30), Max: 98.1 (28 Jul 2024 21:15)  HR: 56 (29 Jul 2024 04:30) (54 - 64)  BP: 121/50 (29 Jul 2024 04:30) (121/50 - 158/76)  BP(mean): 74 (29 Jul 2024 04:30) (74 - 108)  RR: 18 (29 Jul 2024 04:30) (18 - 18)  SpO2: 97% (29 Jul 2024 04:30) (95% - 97%)    Parameters below as of 29 Jul 2024 04:30  Patient On (Oxygen Delivery Method): room air        Neurological Exam:   Mental status: Awake, alert and oriented x3.  Recent and remote memory intact.  Naming, repetition and comprehension intact.  Attention/concentration intact.  No dysarthria, no aphasia.  Fund of knowledge appropriate.    Cranial nerves: Pupils equally round and reactive to light, visual fields full, no nystagmus, extraocular muscles intact, V1 through V3 intact bilaterally and symmetric, face symmetric, hearing intact to finger rub, palate elevation symmetric, tongue was midline.  Motor:  MRC grading 5/5 b/l UE/LE.   strength 5/5.  Normal tone and bulk.  No abnormal movements.    Sensation: Intact to light touch, proprioception, and pinprick.   Coordination: No dysmetria on finger-to-nose and heel-to-shin.  No dysdiadokinesia.  Reflexes: 2+ in bilateral UE/LE, downgoing toes bilaterally. (-) Degroot.  Gait: Narrow and steady. No ataxia.  Romberg negative    Labs:  CBC Full  -  ( 28 Jul 2024 12:29 )  WBC Count : 6.56 K/uL  RBC Count : 4.13 M/uL  Hemoglobin : 13.1 g/dL  Hematocrit : 39.7 %  Platelet Count - Automated : 152 K/uL  Mean Cell Volume : 96.1 fL  Mean Cell Hemoglobin : 31.7 pg  Mean Cell Hemoglobin Concentration : 33.0 g/dL  Auto Neutrophil # : 4.71 K/uL  Auto Lymphocyte # : 1.16 K/uL  Auto Monocyte # : 0.48 K/uL  Auto Eosinophil # : 0.14 K/uL  Auto Basophil # : 0.03 K/uL  Auto Neutrophil % : 71.8 %  Auto Lymphocyte % : 17.7 %  Auto Monocyte % : 7.3 %  Auto Eosinophil % : 2.1 %  Auto Basophil % : 0.5 %    07-28    141  |  108  |  17  ----------------------------<  110<H>  4.5   |  19  |  1.0    Ca    10.7<H>      28 Jul 2024 12:29  Mg     1.8     07-28    TPro  6.0  /  Alb  4.1  /  TBili  1.0  /  DBili  x   /  AST  44<H>  /  ALT  34  /  AlkPhos  89  07-28    LIVER FUNCTIONS - ( 28 Jul 2024 12:29 )  Alb: 4.1 g/dL / Pro: 6.0 g/dL / ALK PHOS: 89 U/L / ALT: 34 U/L / AST: 44 U/L / GGT: x             Urinalysis Basic - ( 28 Jul 2024 12:29 )    Color: x / Appearance: x / SG: x / pH: x  Gluc: 110 mg/dL / Ketone: x  / Bili: x / Urobili: x   Blood: x / Protein: x / Nitrite: x   Leuk Esterase: x / RBC: x / WBC x   Sq Epi: x / Non Sq Epi: x / Bacteria: x        Assessment:  This is a 83y Female w/ h/o     Plan: Neurology Progress Note    Interval History:    The patient was encountered sitting up in a chair, in good spirits, and with no acute complaints. She said she had a little trouble sleeping o/n, but melatonin helped.     HPI:  82-year-old right handed, no substance use, living with , retired; with PMHx of HTN, HLD, urinary incontinence, GERD; presented today for evaluation of stroke.  Patient had MRI performed which is indicative of stroke. Patient presented 4 days ago in the morning with dysarthria and difficulty swallow that wouldn't resolve. Initially she thought it was a cold, but finally today she visited her PCP Dr. Dominguez, who ordered the workup and referred her to ED for evaluation. Patient will be admitted to neurology for further workup and management    In ED:    T(C): 37.2 (23 Jul 2024 19:41), Max: 37.2 (23 Jul 2024 19:41)  T(F): 98.9 (23 Jul 2024 19:41), Max: 98.9 (23 Jul 2024 19:41)  HR: 64 (23 Jul 2024 19:41) (64 - 64)  BP: 153/79 (23 Jul 2024 19:41) (153/79 - 153/79)  RR: 18 (23 Jul 2024 19:41) (18 - 18)  SpO2: 95% (23 Jul 2024 19:41) (95% - 95%)  Patient On (Oxygen Delivery Method): room air    Labs: HCO3 17 . Ca 10.7. Mg 1.6    CT HEAD: No evidence of acute intracranial hemorrhage, mass, or midline shift. Chronic microvascular changes.    CTA: Negative CTA of the head and neck. No evidence of major vascular stenosis, occlusion, or aneurysm.         (23 Jul 2024 23:03)      PAST MEDICAL & SURGICAL HISTORY:  Urinary incontinence    HTN (hypertension)    Hyperlipemia    GERD (gastroesophageal reflux disease)    No significant past surgical history            Medications:  aspirin enteric coated 81 milliGRAM(s) Oral daily  atorvastatin 80 milliGRAM(s) Oral at bedtime  clopidogrel Tablet      clopidogrel Tablet 75 milliGRAM(s) Oral daily  enoxaparin Injectable 40 milliGRAM(s) SubCutaneous every 24 hours  losartan 100 milliGRAM(s) Oral daily  melatonin 3 milliGRAM(s) Oral at bedtime  metoprolol tartrate 100 milliGRAM(s) Oral two times a day  oxybutynin 5 milliGRAM(s) Oral two times a day  pantoprazole    Tablet 40 milliGRAM(s) Oral before breakfast  polyethylene glycol 3350 17 Gram(s) Oral daily  senna 1 Tablet(s) Oral daily      Vital Signs Last 24 Hrs  T(C): 36.4 (29 Jul 2024 04:30), Max: 36.7 (28 Jul 2024 21:15)  T(F): 97.6 (29 Jul 2024 04:30), Max: 98.1 (28 Jul 2024 21:15)  HR: 56 (29 Jul 2024 04:30) (54 - 64)  BP: 121/50 (29 Jul 2024 04:30) (121/50 - 158/76)  BP(mean): 74 (29 Jul 2024 04:30) (74 - 108)  RR: 18 (29 Jul 2024 04:30) (18 - 18)  SpO2: 97% (29 Jul 2024 04:30) (95% - 97%)    Parameters below as of 29 Jul 2024 04:30  Patient On (Oxygen Delivery Method): room air        Neurological Exam:   Mental status: Awake, alert and oriented x3.  Recent and remote memory intact.  Naming, repetition and comprehension intact.  Attention/concentration intact.  No dysarthria, no aphasia.  Fund of knowledge appropriate.    Cranial nerves: Pupils equally round and reactive to light, visual fields full, no nystagmus, extraocular muscles intact, V1 through V3 intact bilaterally and symmetric, face symmetric, hearing intact to finger rub, palate elevation symmetric, tongue was midline.  Motor:  MRC grading 5/5 b/l UE/LE.   strength 5/5.  Normal tone and bulk.  No abnormal movements.    Sensation: Intact to light touch, proprioception, and pinprick.   Coordination: No dysmetria on finger-to-nose and heel-to-shin.  No dysdiadokinesia.  Reflexes: 2+ in bilateral UE/LE, downgoing toes bilaterally. (-) Degroot.  Gait: Narrow and steady. No ataxia.  Romberg negative    Labs:  CBC Full  -  ( 28 Jul 2024 12:29 )  WBC Count : 6.56 K/uL  RBC Count : 4.13 M/uL  Hemoglobin : 13.1 g/dL  Hematocrit : 39.7 %  Platelet Count - Automated : 152 K/uL  Mean Cell Volume : 96.1 fL  Mean Cell Hemoglobin : 31.7 pg  Mean Cell Hemoglobin Concentration : 33.0 g/dL  Auto Neutrophil # : 4.71 K/uL  Auto Lymphocyte # : 1.16 K/uL  Auto Monocyte # : 0.48 K/uL  Auto Eosinophil # : 0.14 K/uL  Auto Basophil # : 0.03 K/uL  Auto Neutrophil % : 71.8 %  Auto Lymphocyte % : 17.7 %  Auto Monocyte % : 7.3 %  Auto Eosinophil % : 2.1 %  Auto Basophil % : 0.5 %    07-28    141  |  108  |  17  ----------------------------<  110<H>  4.5   |  19  |  1.0    Ca    10.7<H>      28 Jul 2024 12:29  Mg     1.8     07-28    TPro  6.0  /  Alb  4.1  /  TBili  1.0  /  DBili  x   /  AST  44<H>  /  ALT  34  /  AlkPhos  89  07-28    LIVER FUNCTIONS - ( 28 Jul 2024 12:29 )  Alb: 4.1 g/dL / Pro: 6.0 g/dL / ALK PHOS: 89 U/L / ALT: 34 U/L / AST: 44 U/L / GGT: x             Urinalysis Basic - ( 28 Jul 2024 12:29 )    Color: x / Appearance: x / SG: x / pH: x  Gluc: 110 mg/dL / Ketone: x  / Bili: x / Urobili: x   Blood: x / Protein: x / Nitrite: x   Leuk Esterase: x / RBC: x / WBC x   Sq Epi: x / Non Sq Epi: x / Bacteria: x        Assessment:  This is a 83y Female w/ h/o     Plan:

## 2024-07-29 NOTE — PROGRESS NOTE ADULT - ASSESSMENT
83-year-old right handed, no substance use, living with , retired; with PMHx of HTN, HLD, urinary incontinence, GERD; presented for evaluation of stroke. Patient presented with dysarthria and difficulty swallow that wouldn't resolve. Initially she thought it was a cold, but finally today she visited her PCP Dr. Dominguez, who ordered the workup. Found to have acute infarct in left corona radiata/basal ganglia on MRI. CTA did not show evidence of major vascular stenosis, occlusion, or aneurysm. Etiology likely small vessel disease vs ESUS.    #Cerebral infarction in left corona radiata/basal ganglia   Etiology likely small vessel disease vs ESUS. LDL 84, A1c 6  S/P Plavix 300 mg x1  - continue aspirin 81mg and plavix 75mg daily for 21 days   - continue atorvastatin 80mg daily  - EP consulted, Pt and family refused ILR placement. Risks and benefits discussed at length.   - q8hr stroke neuro checks and vitals  - Goal -160  - PT/OT/Physiatry; home rehab    #Sleep  - c/w melatonin 3mg bedtime    #HTN  - Goal -180  - continue home blood pressure medication metoprolol and irbersartan    #HLD  Hold home med pravastatin  - C/w high dose statin  - LDL results: 84    #Urinary incontinence  On home med Myrbetriq and tolterodine  Hold tolterodine for now  - C/w oxybutynin 5 mg BID    #Misc:   DVT Prophylaxis: Lovenox sq  GI prophylaxis for GERD: Pantoprazole 40 mg QD  Diet: regular  Dispo: Home rehab

## 2024-07-31 PROBLEM — I10 ESSENTIAL (PRIMARY) HYPERTENSION: Chronic | Status: ACTIVE | Noted: 2024-07-23

## 2024-07-31 PROBLEM — E78.5 HYPERLIPIDEMIA, UNSPECIFIED: Chronic | Status: ACTIVE | Noted: 2024-07-23

## 2024-07-31 PROBLEM — R32 UNSPECIFIED URINARY INCONTINENCE: Chronic | Status: ACTIVE | Noted: 2024-07-23

## 2024-08-09 DIAGNOSIS — G81.91 HEMIPLEGIA, UNSPECIFIED AFFECTING RIGHT DOMINANT SIDE: ICD-10-CM

## 2024-08-09 DIAGNOSIS — R29.702 NIHSS SCORE 2: ICD-10-CM

## 2024-08-09 DIAGNOSIS — K21.9 GASTRO-ESOPHAGEAL REFLUX DISEASE WITHOUT ESOPHAGITIS: ICD-10-CM

## 2024-08-09 DIAGNOSIS — Z53.29 PROCEDURE AND TREATMENT NOT CARRIED OUT BECAUSE OF PATIENT'S DECISION FOR OTHER REASONS: ICD-10-CM

## 2024-08-09 DIAGNOSIS — I10 ESSENTIAL (PRIMARY) HYPERTENSION: ICD-10-CM

## 2024-08-09 DIAGNOSIS — E83.42 HYPOMAGNESEMIA: ICD-10-CM

## 2024-08-09 DIAGNOSIS — I63.81 OTHER CEREBRAL INFARCTION DUE TO OCCLUSION OR STENOSIS OF SMALL ARTERY: ICD-10-CM

## 2024-08-09 DIAGNOSIS — R29.810 FACIAL WEAKNESS: ICD-10-CM

## 2024-08-09 DIAGNOSIS — R32 UNSPECIFIED URINARY INCONTINENCE: ICD-10-CM

## 2024-08-09 DIAGNOSIS — E78.5 HYPERLIPIDEMIA, UNSPECIFIED: ICD-10-CM

## 2024-08-09 DIAGNOSIS — R47.1 DYSARTHRIA AND ANARTHRIA: ICD-10-CM

## 2024-08-09 DIAGNOSIS — R47.81 SLURRED SPEECH: ICD-10-CM

## 2024-08-09 DIAGNOSIS — E11.9 TYPE 2 DIABETES MELLITUS WITHOUT COMPLICATIONS: ICD-10-CM

## 2024-08-29 ENCOUNTER — APPOINTMENT (OUTPATIENT)
Dept: NEUROLOGY | Facility: CLINIC | Age: 84
End: 2024-08-29

## 2024-09-03 ENCOUNTER — APPOINTMENT (OUTPATIENT)
Dept: ORTHOPEDIC SURGERY | Facility: CLINIC | Age: 84
End: 2024-09-03

## 2024-09-05 ENCOUNTER — APPOINTMENT (OUTPATIENT)
Dept: ORTHOPEDIC SURGERY | Facility: CLINIC | Age: 84
End: 2024-09-05

## 2024-09-05 DIAGNOSIS — M51.9 UNSPECIFIED THORACIC, THORACOLUMBAR AND LUMBOSACRAL INTERVERTEBRAL DISC DISORDER: ICD-10-CM

## 2024-09-05 DIAGNOSIS — M17.11 UNILATERAL PRIMARY OSTEOARTHRITIS, RIGHT KNEE: ICD-10-CM

## 2024-09-05 DIAGNOSIS — E66.01 MORBID (SEVERE) OBESITY DUE TO EXCESS CALORIES: ICD-10-CM

## 2024-09-05 DIAGNOSIS — M17.12 UNILATERAL PRIMARY OSTEOARTHRITIS, LEFT KNEE: ICD-10-CM

## 2024-09-05 PROCEDURE — G2211 COMPLEX E/M VISIT ADD ON: CPT

## 2024-09-05 PROCEDURE — 99212 OFFICE O/P EST SF 10 MIN: CPT

## 2024-09-05 NOTE — IMAGING
[de-identified] :  markedly overweight\par   spasm in the back no focal neurologic deficits \par  \par  Hip motion good \par   both knees swelling crepitus limits in flexion does have a cane\par  \par  Calf soft negative Homans sign \par  \par  Lumbar x-rays marked arthritic change some scoliosis evidence of arterial stents\par  \par  X-rays both knees moderate arthritic change

## 2024-09-05 NOTE — HISTORY OF PRESENT ILLNESS
[de-identified] : [age] years of age here with family for her low back and knees several years of problems predating pain and de make reports having shots at sometime swelling in the knees swelling in the ankles had some diagnostics put not recent pain is 10/10 sitting makes it worse no therapy allergy to penicillin does not smoke medication for blood pressure gout and vitamins

## 2024-09-05 NOTE — HISTORY OF PRESENT ILLNESS
[de-identified] : [age] years of age here with family for her low back and knees several years of problems predating pain and de make reports having shots at sometime swelling in the knees swelling in the ankles had some diagnostics put not recent pain is 10/10 sitting makes it worse no therapy allergy to penicillin does not smoke medication for blood pressure gout and vitamins

## 2024-09-05 NOTE — IMAGING
[de-identified] :  markedly overweight\par   spasm in the back no focal neurologic deficits \par  \par  Hip motion good \par   both knees swelling crepitus limits in flexion does have a cane\par  \par  Calf soft negative Homans sign \par  \par  Lumbar x-rays marked arthritic change some scoliosis evidence of arterial stents\par  \par  X-rays both knees moderate arthritic change

## 2024-09-05 NOTE — ASSESSMENT
[FreeTextEntry1] : Gel was helpful certainly could repeated but I am fearful that without any substantive weight reduction the back pain is only going to get worse

## 2024-09-05 NOTE — REASON FOR VISIT
[Spouse] : spouse [FreeTextEntry2] : Patient is coming in for a follow up on bilateral kneegel was helpful  lost 10 pounds  had a stroke last month  still LBP

## 2024-09-27 ENCOUNTER — APPOINTMENT (OUTPATIENT)
Dept: NEUROLOGY | Facility: CLINIC | Age: 84
End: 2024-09-27

## 2024-10-14 ENCOUNTER — APPOINTMENT (OUTPATIENT)
Dept: CARDIOLOGY | Facility: CLINIC | Age: 84
End: 2024-10-14
Payer: MEDICARE

## 2024-10-14 VITALS
HEIGHT: 60 IN | SYSTOLIC BLOOD PRESSURE: 180 MMHG | WEIGHT: 179 LBS | DIASTOLIC BLOOD PRESSURE: 80 MMHG | BODY MASS INDEX: 35.14 KG/M2 | HEART RATE: 56 BPM

## 2024-10-14 DIAGNOSIS — N32.81 OVERACTIVE BLADDER: ICD-10-CM

## 2024-10-14 DIAGNOSIS — Z78.9 OTHER SPECIFIED HEALTH STATUS: ICD-10-CM

## 2024-10-14 DIAGNOSIS — E78.5 HYPERLIPIDEMIA, UNSPECIFIED: ICD-10-CM

## 2024-10-14 DIAGNOSIS — K21.9 GASTRO-ESOPHAGEAL REFLUX DISEASE W/OUT ESOPHAGITIS: ICD-10-CM

## 2024-10-14 DIAGNOSIS — I63.9 CEREBRAL INFARCTION, UNSPECIFIED: ICD-10-CM

## 2024-10-14 DIAGNOSIS — I10 ESSENTIAL (PRIMARY) HYPERTENSION: ICD-10-CM

## 2024-10-14 DIAGNOSIS — R94.31 ABNORMAL ELECTROCARDIOGRAM [ECG] [EKG]: ICD-10-CM

## 2024-10-14 DIAGNOSIS — R60.0 LOCALIZED EDEMA: ICD-10-CM

## 2024-10-14 PROCEDURE — 99204 OFFICE O/P NEW MOD 45 MIN: CPT | Mod: 25

## 2024-10-14 PROCEDURE — 93000 ELECTROCARDIOGRAM COMPLETE: CPT

## 2024-10-14 PROCEDURE — ZZZZZ: CPT

## 2024-10-14 RX ORDER — PANTOPRAZOLE 40 MG/1
40 TABLET, DELAYED RELEASE ORAL DAILY
Refills: 0 | Status: ACTIVE | COMMUNITY

## 2024-10-14 RX ORDER — ATORVASTATIN CALCIUM 80 MG/1
80 TABLET, FILM COATED ORAL DAILY
Refills: 0 | Status: ACTIVE | COMMUNITY

## 2024-10-14 RX ORDER — IRBESARTAN 300 MG/1
300 TABLET ORAL DAILY
Refills: 0 | Status: ACTIVE | COMMUNITY

## 2024-10-14 RX ORDER — HYDROCHLOROTHIAZIDE 12.5 MG/1
12.5 CAPSULE ORAL DAILY
Qty: 90 | Refills: 2 | Status: ACTIVE | COMMUNITY
Start: 2024-10-14 | End: 1900-01-01

## 2024-10-14 RX ORDER — CLOPIDOGREL BISULFATE 75 MG/1
75 TABLET, FILM COATED ORAL DAILY
Refills: 0 | Status: ACTIVE | COMMUNITY

## 2024-10-14 RX ORDER — VIBEGRON 75 MG/1
75 TABLET, FILM COATED ORAL
Refills: 0 | Status: ACTIVE | COMMUNITY

## 2024-10-14 RX ORDER — METOPROLOL TARTRATE 100 MG/1
100 TABLET ORAL TWICE DAILY
Refills: 0 | Status: ACTIVE | COMMUNITY

## 2024-11-10 NOTE — HISTORY OF PRESENT ILLNESS
[de-identified] : [age] years of age here with family for her low back and knees several years of problems predating pain and de make reports having shots at sometime swelling in the knees swelling in the ankles had some diagnostics put not recent pain is 10/10 sitting makes it worse no therapy allergy to penicillin does not smoke medication for blood pressure gout and vitamins
Clothing

## 2024-11-22 ENCOUNTER — APPOINTMENT (OUTPATIENT)
Dept: CARDIOLOGY | Facility: CLINIC | Age: 84
End: 2024-11-22

## 2024-12-12 ENCOUNTER — APPOINTMENT (OUTPATIENT)
Dept: ORTHOPEDIC SURGERY | Facility: CLINIC | Age: 84
End: 2024-12-12
Payer: MEDICARE

## 2024-12-12 DIAGNOSIS — M17.11 UNILATERAL PRIMARY OSTEOARTHRITIS, RIGHT KNEE: ICD-10-CM

## 2024-12-12 DIAGNOSIS — M17.12 UNILATERAL PRIMARY OSTEOARTHRITIS, LEFT KNEE: ICD-10-CM

## 2024-12-12 DIAGNOSIS — E66.01 MORBID (SEVERE) OBESITY DUE TO EXCESS CALORIES: ICD-10-CM

## 2024-12-12 DIAGNOSIS — M51.9 UNSPECIFIED THORACIC, THORACOLUMBAR AND LUMBOSACRAL INTERVERTEBRAL DISC DISORDER: ICD-10-CM

## 2024-12-12 PROCEDURE — 20610 DRAIN/INJ JOINT/BURSA W/O US: CPT | Mod: 50

## 2025-03-03 ENCOUNTER — APPOINTMENT (OUTPATIENT)
Dept: CARDIOLOGY | Facility: CLINIC | Age: 85
End: 2025-03-03

## 2025-03-03 ENCOUNTER — APPOINTMENT (OUTPATIENT)
Dept: CARDIOLOGY | Facility: CLINIC | Age: 85
End: 2025-03-03
Payer: MEDICARE

## 2025-03-03 VITALS
HEIGHT: 60 IN | HEART RATE: 63 BPM | SYSTOLIC BLOOD PRESSURE: 154 MMHG | BODY MASS INDEX: 33.96 KG/M2 | WEIGHT: 173 LBS | DIASTOLIC BLOOD PRESSURE: 84 MMHG

## 2025-03-03 DIAGNOSIS — I10 ESSENTIAL (PRIMARY) HYPERTENSION: ICD-10-CM

## 2025-03-03 DIAGNOSIS — I63.9 CEREBRAL INFARCTION, UNSPECIFIED: ICD-10-CM

## 2025-03-03 DIAGNOSIS — E78.5 HYPERLIPIDEMIA, UNSPECIFIED: ICD-10-CM

## 2025-03-03 DIAGNOSIS — R94.31 ABNORMAL ELECTROCARDIOGRAM [ECG] [EKG]: ICD-10-CM

## 2025-03-03 PROCEDURE — 99214 OFFICE O/P EST MOD 30 MIN: CPT | Mod: 25

## 2025-03-03 PROCEDURE — 93000 ELECTROCARDIOGRAM COMPLETE: CPT

## 2025-03-03 RX ORDER — TROSPIUM CHLORIDE 60 MG/1
60 CAPSULE, EXTENDED RELEASE ORAL DAILY
Refills: 0 | Status: ACTIVE | COMMUNITY

## 2025-03-03 RX ORDER — CARVEDILOL 12.5 MG/1
12.5 TABLET, FILM COATED ORAL TWICE DAILY
Qty: 180 | Refills: 0 | Status: ACTIVE | COMMUNITY
Start: 2025-03-03 | End: 1900-01-01

## 2025-03-05 NOTE — ED ADULT TRIAGE NOTE - HEART RATE (BEATS/MIN)
[FreeTextEntry1] : subdural hematoma repeat ct of head hld continue simvastatin cad continue asa ckd  lab evaluation 64

## 2025-03-25 ENCOUNTER — APPOINTMENT (OUTPATIENT)
Dept: ORTHOPEDIC SURGERY | Facility: CLINIC | Age: 85
End: 2025-03-25
Payer: MEDICARE

## 2025-03-25 DIAGNOSIS — M17.12 UNILATERAL PRIMARY OSTEOARTHRITIS, LEFT KNEE: ICD-10-CM

## 2025-03-25 DIAGNOSIS — M17.11 UNILATERAL PRIMARY OSTEOARTHRITIS, RIGHT KNEE: ICD-10-CM

## 2025-03-25 DIAGNOSIS — E66.01 MORBID (SEVERE) OBESITY DUE TO EXCESS CALORIES: ICD-10-CM

## 2025-03-25 DIAGNOSIS — M51.9 UNSPECIFIED THORACIC, THORACOLUMBAR AND LUMBOSACRAL INTERVERTEBRAL DISC DISORDER: ICD-10-CM

## 2025-03-25 PROCEDURE — 99213 OFFICE O/P EST LOW 20 MIN: CPT

## 2025-03-25 PROCEDURE — G2211 COMPLEX E/M VISIT ADD ON: CPT

## 2025-05-13 ENCOUNTER — APPOINTMENT (OUTPATIENT)
Dept: CARDIOLOGY | Facility: CLINIC | Age: 85
End: 2025-05-13
Payer: MEDICARE

## 2025-05-13 ENCOUNTER — NON-APPOINTMENT (OUTPATIENT)
Age: 85
End: 2025-05-13

## 2025-05-13 ENCOUNTER — APPOINTMENT (OUTPATIENT)
Dept: CARDIOLOGY | Facility: CLINIC | Age: 85
End: 2025-05-13

## 2025-05-13 VITALS
WEIGHT: 170 LBS | DIASTOLIC BLOOD PRESSURE: 70 MMHG | SYSTOLIC BLOOD PRESSURE: 178 MMHG | BODY MASS INDEX: 33.38 KG/M2 | HEART RATE: 63 BPM | HEIGHT: 60 IN

## 2025-05-13 DIAGNOSIS — I10 ESSENTIAL (PRIMARY) HYPERTENSION: ICD-10-CM

## 2025-05-13 DIAGNOSIS — R60.0 LOCALIZED EDEMA: ICD-10-CM

## 2025-05-13 DIAGNOSIS — E66.01 MORBID (SEVERE) OBESITY DUE TO EXCESS CALORIES: ICD-10-CM

## 2025-05-13 DIAGNOSIS — E78.5 HYPERLIPIDEMIA, UNSPECIFIED: ICD-10-CM

## 2025-05-13 PROCEDURE — 99214 OFFICE O/P EST MOD 30 MIN: CPT | Mod: 25

## 2025-05-13 PROCEDURE — 93000 ELECTROCARDIOGRAM COMPLETE: CPT

## 2025-05-13 RX ORDER — DOXAZOSIN 1 MG/1
1 TABLET ORAL AT BEDTIME
Qty: 90 | Refills: 0 | Status: ACTIVE | COMMUNITY
Start: 2025-05-13 | End: 1900-01-01

## 2025-05-13 RX ORDER — MIRABEGRON 50 MG/1
50 TABLET, FILM COATED, EXTENDED RELEASE ORAL
Refills: 0 | Status: ACTIVE | COMMUNITY

## 2025-05-27 ENCOUNTER — RX RENEWAL (OUTPATIENT)
Age: 85
End: 2025-05-27

## 2025-05-27 RX ORDER — CARVEDILOL 12.5 MG/1
12.5 TABLET, FILM COATED ORAL
Qty: 180 | Refills: 0 | Status: ACTIVE | COMMUNITY
Start: 2025-05-27 | End: 1900-01-01

## 2025-06-07 NOTE — SWALLOW BEDSIDE ASSESSMENT ADULT - ORAL PHASE
Caller returning call to Clinical Team- Connected to [facility]- Connect call to Triage queue- Route message to provider's clinical support pool .     Reason call connected to clinical:  patient and spouse saw results in [facility] and have questions.      Within functional limits

## 2025-06-27 ENCOUNTER — APPOINTMENT (OUTPATIENT)
Dept: ORTHOPEDIC SURGERY | Facility: CLINIC | Age: 85
End: 2025-06-27

## 2025-06-27 PROCEDURE — 20610 DRAIN/INJ JOINT/BURSA W/O US: CPT | Mod: 50
